# Patient Record
Sex: MALE | Race: WHITE | Employment: FULL TIME | URBAN - METROPOLITAN AREA
[De-identification: names, ages, dates, MRNs, and addresses within clinical notes are randomized per-mention and may not be internally consistent; named-entity substitution may affect disease eponyms.]

---

## 2023-03-08 PROBLEM — M75.41 SHOULDER IMPINGEMENT SYNDROME, RIGHT: Status: ACTIVE | Noted: 2023-03-08

## 2023-03-08 PROBLEM — K40.90 INGUINAL HERNIA, LEFT: Status: ACTIVE | Noted: 2023-03-08

## 2023-03-08 PROBLEM — M25.552 LEFT HIP PAIN: Status: ACTIVE | Noted: 2023-03-08

## 2023-03-08 PROBLEM — R10.9 ABDOMINAL PAIN: Status: ACTIVE | Noted: 2023-03-08

## 2023-03-08 PROBLEM — M25.552 PAIN OF BOTH HIP JOINTS: Status: ACTIVE | Noted: 2023-03-08

## 2023-03-08 PROBLEM — R03.0 ELEVATED BLOOD PRESSURE READING WITHOUT DIAGNOSIS OF HYPERTENSION: Status: ACTIVE | Noted: 2023-03-08

## 2023-03-08 PROBLEM — R05.9 COUGH: Status: ACTIVE | Noted: 2023-03-08

## 2023-03-08 PROBLEM — E66.01 MORBID OBESITY (MULTI): Status: ACTIVE | Noted: 2023-03-08

## 2023-03-08 PROBLEM — R79.89 LOW TESTOSTERONE: Status: ACTIVE | Noted: 2023-03-08

## 2023-03-08 PROBLEM — E66.01 MORBID OBESITY WITH BMI OF 40.0-44.9, ADULT (MULTI): Status: ACTIVE | Noted: 2023-03-08

## 2023-03-08 PROBLEM — L72.9 INFECTED CYST OF SKIN: Status: ACTIVE | Noted: 2023-03-08

## 2023-03-08 PROBLEM — S43.439A GLENOID LABRUM TEAR: Status: ACTIVE | Noted: 2023-03-08

## 2023-03-08 PROBLEM — M79.18 LEFT BUTTOCK PAIN: Status: ACTIVE | Noted: 2023-03-08

## 2023-03-08 PROBLEM — K40.20 NON-RECURRENT BILATERAL INGUINAL HERNIA WITHOUT OBSTRUCTION OR GANGRENE: Status: ACTIVE | Noted: 2023-03-08

## 2023-03-08 PROBLEM — L30.9 DERMATITIS: Status: ACTIVE | Noted: 2023-03-08

## 2023-03-08 PROBLEM — M75.20 BICEPS TENDONITIS: Status: ACTIVE | Noted: 2023-03-08

## 2023-03-08 PROBLEM — N52.9 MALE ERECTILE DISORDER: Status: ACTIVE | Noted: 2023-03-08

## 2023-03-08 PROBLEM — M25.561 RIGHT KNEE PAIN: Status: ACTIVE | Noted: 2023-03-08

## 2023-03-08 PROBLEM — M54.50 LOW BACK PAIN: Status: ACTIVE | Noted: 2023-03-08

## 2023-03-08 PROBLEM — R10.9 ABDOMINAL DISCOMFORT: Status: ACTIVE | Noted: 2023-03-08

## 2023-03-08 PROBLEM — M25.552 GREATER TROCHANTERIC PAIN SYNDROME OF LEFT LOWER EXTREMITY: Status: ACTIVE | Noted: 2023-03-08

## 2023-03-08 PROBLEM — T75.3XXA MOTION SICKNESS: Status: ACTIVE | Noted: 2023-03-08

## 2023-03-08 PROBLEM — M79.673 HEEL PAIN: Status: ACTIVE | Noted: 2023-03-08

## 2023-03-08 PROBLEM — R10.32 LEFT GROIN PAIN: Status: ACTIVE | Noted: 2023-03-08

## 2023-03-08 PROBLEM — N45.1 EPIDIDYMITIS: Status: ACTIVE | Noted: 2023-03-08

## 2023-03-08 PROBLEM — N63.0 BREAST MASS IN MALE: Status: ACTIVE | Noted: 2023-03-08

## 2023-03-08 PROBLEM — R63.2 POLYPHAGIA: Status: ACTIVE | Noted: 2023-03-08

## 2023-03-08 PROBLEM — I10 HTN (HYPERTENSION): Status: ACTIVE | Noted: 2023-03-08

## 2023-03-08 PROBLEM — L08.9 INFECTED CYST OF SKIN: Status: ACTIVE | Noted: 2023-03-08

## 2023-03-08 PROBLEM — M75.00 FROZEN SHOULDER: Status: ACTIVE | Noted: 2023-03-08

## 2023-03-08 PROBLEM — M75.81 TENDINITIS OF RIGHT ROTATOR CUFF: Status: ACTIVE | Noted: 2023-03-08

## 2023-03-08 PROBLEM — M25.551 PAIN OF BOTH HIP JOINTS: Status: ACTIVE | Noted: 2023-03-08

## 2023-03-08 PROBLEM — R68.82 LOW LIBIDO: Status: ACTIVE | Noted: 2023-03-08

## 2023-03-08 PROBLEM — M79.606 LEG PAIN: Status: ACTIVE | Noted: 2023-03-08

## 2023-03-08 PROBLEM — M51.36 LUMBAR DEGENERATIVE DISC DISEASE: Status: ACTIVE | Noted: 2023-03-08

## 2023-03-08 PROBLEM — N64.4 MASTALGIA: Status: ACTIVE | Noted: 2023-03-08

## 2023-03-08 PROBLEM — N50.819 PERSISTENT TESTICULAR PAIN: Status: ACTIVE | Noted: 2023-03-08

## 2023-03-08 PROBLEM — R10.2 MALE PELVIC PAIN: Status: ACTIVE | Noted: 2023-03-08

## 2023-03-08 PROBLEM — M51.369 LUMBAR DEGENERATIVE DISC DISEASE: Status: ACTIVE | Noted: 2023-03-08

## 2023-03-08 RX ORDER — SILDENAFIL 50 MG/1
50 TABLET, FILM COATED ORAL
COMMUNITY
Start: 2022-09-12

## 2023-03-08 RX ORDER — HYDROCHLOROTHIAZIDE 25 MG/1
1 TABLET ORAL DAILY
COMMUNITY
Start: 2014-10-02 | End: 2023-09-25

## 2023-03-08 RX ORDER — VALSARTAN 320 MG/1
1 TABLET ORAL DAILY
COMMUNITY
Start: 2015-01-16 | End: 2023-07-10

## 2023-03-13 ENCOUNTER — OFFICE VISIT (OUTPATIENT)
Dept: PRIMARY CARE | Facility: CLINIC | Age: 63
End: 2023-03-13
Payer: COMMERCIAL

## 2023-03-13 VITALS
BODY MASS INDEX: 41.72 KG/M2 | OXYGEN SATURATION: 95 % | WEIGHT: 308 LBS | HEART RATE: 99 BPM | TEMPERATURE: 98.5 F | SYSTOLIC BLOOD PRESSURE: 114 MMHG | DIASTOLIC BLOOD PRESSURE: 78 MMHG | HEIGHT: 72 IN

## 2023-03-13 DIAGNOSIS — R05.1 ACUTE COUGH: ICD-10-CM

## 2023-03-13 DIAGNOSIS — G93.31 POST VIRAL SYNDROME: Primary | ICD-10-CM

## 2023-03-13 DIAGNOSIS — I10 PRIMARY HYPERTENSION: ICD-10-CM

## 2023-03-13 PROBLEM — R10.9 ABDOMINAL PAIN: Status: RESOLVED | Noted: 2023-03-08 | Resolved: 2023-03-13

## 2023-03-13 PROBLEM — M79.673 HEEL PAIN: Status: RESOLVED | Noted: 2023-03-08 | Resolved: 2023-03-13

## 2023-03-13 PROBLEM — R63.2 POLYPHAGIA: Status: RESOLVED | Noted: 2023-03-08 | Resolved: 2023-03-13

## 2023-03-13 PROBLEM — M79.606 LEG PAIN: Status: RESOLVED | Noted: 2023-03-08 | Resolved: 2023-03-13

## 2023-03-13 PROBLEM — N50.819 PERSISTENT TESTICULAR PAIN: Status: RESOLVED | Noted: 2023-03-08 | Resolved: 2023-03-13

## 2023-03-13 PROBLEM — S43.439A GLENOID LABRUM TEAR: Status: RESOLVED | Noted: 2023-03-08 | Resolved: 2023-03-13

## 2023-03-13 PROBLEM — M25.561 RIGHT KNEE PAIN: Status: RESOLVED | Noted: 2023-03-08 | Resolved: 2023-03-13

## 2023-03-13 PROBLEM — R10.2 MALE PELVIC PAIN: Status: RESOLVED | Noted: 2023-03-08 | Resolved: 2023-03-13

## 2023-03-13 PROBLEM — E66.01 MORBID OBESITY (MULTI): Status: RESOLVED | Noted: 2023-03-08 | Resolved: 2023-03-13

## 2023-03-13 PROBLEM — N63.0 BREAST MASS IN MALE: Status: RESOLVED | Noted: 2023-03-08 | Resolved: 2023-03-13

## 2023-03-13 PROBLEM — M25.552 GREATER TROCHANTERIC PAIN SYNDROME OF LEFT LOWER EXTREMITY: Status: RESOLVED | Noted: 2023-03-08 | Resolved: 2023-03-13

## 2023-03-13 PROBLEM — K40.90 INGUINAL HERNIA, LEFT: Status: RESOLVED | Noted: 2023-03-08 | Resolved: 2023-03-13

## 2023-03-13 PROBLEM — R10.32 LEFT GROIN PAIN: Status: RESOLVED | Noted: 2023-03-08 | Resolved: 2023-03-13

## 2023-03-13 PROBLEM — M79.18 LEFT BUTTOCK PAIN: Status: RESOLVED | Noted: 2023-03-08 | Resolved: 2023-03-13

## 2023-03-13 PROBLEM — M51.36 LUMBAR DEGENERATIVE DISC DISEASE: Status: RESOLVED | Noted: 2023-03-08 | Resolved: 2023-03-13

## 2023-03-13 PROBLEM — R68.82 LOW LIBIDO: Status: RESOLVED | Noted: 2023-03-08 | Resolved: 2023-03-13

## 2023-03-13 PROBLEM — L08.9 INFECTED CYST OF SKIN: Status: RESOLVED | Noted: 2023-03-08 | Resolved: 2023-03-13

## 2023-03-13 PROBLEM — R79.89 LOW TESTOSTERONE: Status: RESOLVED | Noted: 2023-03-08 | Resolved: 2023-03-13

## 2023-03-13 PROBLEM — M25.552 LEFT HIP PAIN: Status: RESOLVED | Noted: 2023-03-08 | Resolved: 2023-03-13

## 2023-03-13 PROBLEM — M75.41 SHOULDER IMPINGEMENT SYNDROME, RIGHT: Status: RESOLVED | Noted: 2023-03-08 | Resolved: 2023-03-13

## 2023-03-13 PROBLEM — M75.81 TENDINITIS OF RIGHT ROTATOR CUFF: Status: RESOLVED | Noted: 2023-03-08 | Resolved: 2023-03-13

## 2023-03-13 PROBLEM — M25.552 PAIN OF BOTH HIP JOINTS: Status: RESOLVED | Noted: 2023-03-08 | Resolved: 2023-03-13

## 2023-03-13 PROBLEM — T75.3XXA MOTION SICKNESS: Status: RESOLVED | Noted: 2023-03-08 | Resolved: 2023-03-13

## 2023-03-13 PROBLEM — N45.1 EPIDIDYMITIS: Status: RESOLVED | Noted: 2023-03-08 | Resolved: 2023-03-13

## 2023-03-13 PROBLEM — N64.4 MASTALGIA: Status: RESOLVED | Noted: 2023-03-08 | Resolved: 2023-03-13

## 2023-03-13 PROBLEM — M75.20 BICEPS TENDONITIS: Status: RESOLVED | Noted: 2023-03-08 | Resolved: 2023-03-13

## 2023-03-13 PROBLEM — K40.20 NON-RECURRENT BILATERAL INGUINAL HERNIA WITHOUT OBSTRUCTION OR GANGRENE: Status: RESOLVED | Noted: 2023-03-08 | Resolved: 2023-03-13

## 2023-03-13 PROBLEM — R10.9 ABDOMINAL DISCOMFORT: Status: RESOLVED | Noted: 2023-03-08 | Resolved: 2023-03-13

## 2023-03-13 PROBLEM — L72.9 INFECTED CYST OF SKIN: Status: RESOLVED | Noted: 2023-03-08 | Resolved: 2023-03-13

## 2023-03-13 PROBLEM — R03.0 ELEVATED BLOOD PRESSURE READING WITHOUT DIAGNOSIS OF HYPERTENSION: Status: RESOLVED | Noted: 2023-03-08 | Resolved: 2023-03-13

## 2023-03-13 PROBLEM — M75.00 FROZEN SHOULDER: Status: RESOLVED | Noted: 2023-03-08 | Resolved: 2023-03-13

## 2023-03-13 PROBLEM — L30.9 DERMATITIS: Status: RESOLVED | Noted: 2023-03-08 | Resolved: 2023-03-13

## 2023-03-13 PROBLEM — M25.551 PAIN OF BOTH HIP JOINTS: Status: RESOLVED | Noted: 2023-03-08 | Resolved: 2023-03-13

## 2023-03-13 PROBLEM — M54.50 LOW BACK PAIN: Status: RESOLVED | Noted: 2023-03-08 | Resolved: 2023-03-13

## 2023-03-13 PROBLEM — M51.369 LUMBAR DEGENERATIVE DISC DISEASE: Status: RESOLVED | Noted: 2023-03-08 | Resolved: 2023-03-13

## 2023-03-13 PROCEDURE — 99213 OFFICE O/P EST LOW 20 MIN: CPT | Performed by: INTERNAL MEDICINE

## 2023-03-13 PROCEDURE — 3078F DIAST BP <80 MM HG: CPT | Performed by: INTERNAL MEDICINE

## 2023-03-13 PROCEDURE — 1036F TOBACCO NON-USER: CPT | Performed by: INTERNAL MEDICINE

## 2023-03-13 PROCEDURE — 3074F SYST BP LT 130 MM HG: CPT | Performed by: INTERNAL MEDICINE

## 2023-03-13 RX ORDER — METHYLPREDNISOLONE 4 MG/1
TABLET ORAL
Qty: 21 TABLET | Refills: 0 | Status: SHIPPED | OUTPATIENT
Start: 2023-03-13 | End: 2023-03-20

## 2023-03-13 RX ORDER — PANTOPRAZOLE SODIUM 40 MG/1
1 TABLET, DELAYED RELEASE ORAL DAILY
COMMUNITY
Start: 2023-01-29

## 2023-03-13 RX ORDER — LORATADINE 10 MG/1
10 TABLET ORAL DAILY
Qty: 30 TABLET | Refills: 2 | Status: SHIPPED | OUTPATIENT
Start: 2023-03-13 | End: 2023-03-16

## 2023-03-13 RX ORDER — BENZONATATE 100 MG/1
100 CAPSULE ORAL 3 TIMES DAILY PRN
Qty: 42 CAPSULE | Refills: 0 | Status: SHIPPED | OUTPATIENT
Start: 2023-03-13 | End: 2023-04-12

## 2023-03-13 ASSESSMENT — PATIENT HEALTH QUESTIONNAIRE - PHQ9
2. FEELING DOWN, DEPRESSED OR HOPELESS: NOT AT ALL
SUM OF ALL RESPONSES TO PHQ9 QUESTIONS 1 AND 2: 0
1. LITTLE INTEREST OR PLEASURE IN DOING THINGS: NOT AT ALL

## 2023-03-13 NOTE — ASSESSMENT & PLAN NOTE
Patient symptoms would be consistent with postviral cough syndrome.  We will Adeline start Tessalon Perles.  Medrol Dosepak prescribed.  Recommend Afrin twice daily for no more than 3 days.  Lungs sound clear.  Recommend Claritin once daily x10 days.  Follow-up if no improvement in 10 days

## 2023-03-13 NOTE — PROGRESS NOTES
Subjective   Patient ID: Blaine Méndez is a 62 y.o. male who presents for Cough (Patient  complaining of persistent cough).    HPI     Patient is a 62-year-old male with past medical history of hypertension and obesity who presents with chief complaint of ongoing cough x30 days.  He states he had an upper respiratory tract infection that has since resolved however the cough has been persistent.  He denies any maxillary sinus pressure or shortness of breath or fevers.  No productive sputum.    Review of Systems  Constitutional: No fever or chills  Cardiovascular: no chest pain, no palpitations and no syncope.   Respiratory: no cough, no shortness of breath during exertion and no shortness of breath at rest.   Gastrointestinal: no abdominal pain, no nausea and no vomiting.  Neuro: No Headache, no dizziness    Objective   /78   Pulse 99   Temp 36.9 °C (98.5 °F)   Ht 1.829 m (6')   Wt (!) 140 kg (308 lb)   SpO2 95%   BMI 41.77 kg/m²     Physical Exam  Constitutional: Alert and in no acute distress. Well developed, well nourished  Head and Face: Head and face: Normal.    Cardiovascular: Heart rate and rhythm were normal, normal S1 and S2. No peripheral edema.   Pulmonary: No respiratory distress. Clear bilateral breath sounds.  Musculoskeletal: Gait and station: Normal. Muscle strength/tone: Normal.   Skin: Normal skin color and pigmentation, normal skin turgor, and no rash.    Psychiatric: Judgment and insight: Intact. Mood and affect: Normal.            Assessment/Plan   Problem List Items Addressed This Visit          Respiratory    Cough       Circulatory    HTN (hypertension)       Other    Post viral syndrome - Primary     Patient symptoms would be consistent with postviral cough syndrome.  We will Adeline start Tessalon Perles.  Medrol Dosepak prescribed.  Recommend Afrin twice daily for no more than 3 days.  Lungs sound clear.  Recommend Claritin once daily x10 days.  Follow-up if no  improvement in 10 days         Relevant Medications    benzonatate (Tessalon) 100 mg capsule    methylPREDNISolone (Medrol Dospak) 4 mg tablets    loratadine (Claritin) 10 mg tablet

## 2023-03-15 DIAGNOSIS — G93.31 POST VIRAL SYNDROME: ICD-10-CM

## 2023-03-16 RX ORDER — LORATADINE 10 MG/1
TABLET ORAL
Qty: 90 TABLET | Refills: 1 | Status: SHIPPED | OUTPATIENT
Start: 2023-03-16

## 2023-07-09 DIAGNOSIS — I10 ESSENTIAL (PRIMARY) HYPERTENSION: ICD-10-CM

## 2023-07-10 RX ORDER — VALSARTAN 320 MG/1
320 TABLET ORAL DAILY
Qty: 90 TABLET | Refills: 2 | Status: SHIPPED | OUTPATIENT
Start: 2023-07-10 | End: 2024-05-06

## 2023-09-24 DIAGNOSIS — I10 ESSENTIAL (PRIMARY) HYPERTENSION: ICD-10-CM

## 2023-09-25 RX ORDER — HYDROCHLOROTHIAZIDE 25 MG/1
25 TABLET ORAL DAILY
Qty: 90 TABLET | Refills: 0 | Status: SHIPPED | OUTPATIENT
Start: 2023-09-25 | End: 2023-12-18

## 2023-10-13 ENCOUNTER — OFFICE VISIT (OUTPATIENT)
Dept: PRIMARY CARE | Facility: CLINIC | Age: 63
End: 2023-10-13
Payer: COMMERCIAL

## 2023-10-13 VITALS
HEIGHT: 72 IN | SYSTOLIC BLOOD PRESSURE: 124 MMHG | DIASTOLIC BLOOD PRESSURE: 77 MMHG | WEIGHT: 289 LBS | BODY MASS INDEX: 39.14 KG/M2 | HEART RATE: 58 BPM

## 2023-10-13 DIAGNOSIS — E66.01 MORBID OBESITY WITH BMI OF 40.0-44.9, ADULT (MULTI): ICD-10-CM

## 2023-10-13 DIAGNOSIS — Z00.00 HEALTH CARE MAINTENANCE: Primary | ICD-10-CM

## 2023-10-13 DIAGNOSIS — Z23 NEEDS FLU SHOT: ICD-10-CM

## 2023-10-13 DIAGNOSIS — N52.9 MALE ERECTILE DISORDER: ICD-10-CM

## 2023-10-13 DIAGNOSIS — I10 PRIMARY HYPERTENSION: ICD-10-CM

## 2023-10-13 LAB
ALBUMIN SERPL BCP-MCNC: 4.3 G/DL (ref 3.4–5)
ALP SERPL-CCNC: 93 U/L (ref 33–136)
ALT SERPL W P-5'-P-CCNC: 19 U/L (ref 10–52)
ANION GAP SERPL CALC-SCNC: 15 MMOL/L (ref 10–20)
AST SERPL W P-5'-P-CCNC: 18 U/L (ref 9–39)
BILIRUB SERPL-MCNC: 0.6 MG/DL (ref 0–1.2)
BUN SERPL-MCNC: 15 MG/DL (ref 6–23)
CALCIUM SERPL-MCNC: 9.5 MG/DL (ref 8.6–10.6)
CHLORIDE SERPL-SCNC: 103 MMOL/L (ref 98–107)
CHOLEST SERPL-MCNC: 187 MG/DL (ref 0–199)
CHOLESTEROL/HDL RATIO: 4.1
CO2 SERPL-SCNC: 24 MMOL/L (ref 21–32)
CREAT SERPL-MCNC: 0.85 MG/DL (ref 0.5–1.3)
ERYTHROCYTE [DISTWIDTH] IN BLOOD BY AUTOMATED COUNT: 14.3 % (ref 11.5–14.5)
GFR SERPL CREATININE-BSD FRML MDRD: >90 ML/MIN/1.73M*2
GLUCOSE SERPL-MCNC: 90 MG/DL (ref 74–99)
HCT VFR BLD AUTO: 49 % (ref 41–52)
HDLC SERPL-MCNC: 45.5 MG/DL
HGB BLD-MCNC: 15.4 G/DL (ref 13.5–17.5)
LDLC SERPL CALC-MCNC: 124 MG/DL
MCH RBC QN AUTO: 28.8 PG (ref 26–34)
MCHC RBC AUTO-ENTMCNC: 31.4 G/DL (ref 32–36)
MCV RBC AUTO: 92 FL (ref 80–100)
NON HDL CHOLESTEROL: 142 MG/DL (ref 0–149)
NRBC BLD-RTO: 0 /100 WBCS (ref 0–0)
PLATELET # BLD AUTO: 265 X10*3/UL (ref 150–450)
PMV BLD AUTO: 9.8 FL (ref 7.5–11.5)
POTASSIUM SERPL-SCNC: 4.1 MMOL/L (ref 3.5–5.3)
PROT SERPL-MCNC: 7.6 G/DL (ref 6.4–8.2)
RBC # BLD AUTO: 5.35 X10*6/UL (ref 4.5–5.9)
SODIUM SERPL-SCNC: 138 MMOL/L (ref 136–145)
T4 FREE SERPL-MCNC: 1.07 NG/DL (ref 0.78–1.48)
TRIGL SERPL-MCNC: 90 MG/DL (ref 0–149)
TSH SERPL-ACNC: 5.09 MIU/L (ref 0.44–3.98)
VLDL: 18 MG/DL (ref 0–40)
WBC # BLD AUTO: 7.1 X10*3/UL (ref 4.4–11.3)

## 2023-10-13 PROCEDURE — 3078F DIAST BP <80 MM HG: CPT | Performed by: INTERNAL MEDICINE

## 2023-10-13 PROCEDURE — 3008F BODY MASS INDEX DOCD: CPT | Performed by: INTERNAL MEDICINE

## 2023-10-13 PROCEDURE — 90471 IMMUNIZATION ADMIN: CPT | Performed by: INTERNAL MEDICINE

## 2023-10-13 PROCEDURE — 90686 IIV4 VACC NO PRSV 0.5 ML IM: CPT | Performed by: INTERNAL MEDICINE

## 2023-10-13 PROCEDURE — 85027 COMPLETE CBC AUTOMATED: CPT

## 2023-10-13 PROCEDURE — 3074F SYST BP LT 130 MM HG: CPT | Performed by: INTERNAL MEDICINE

## 2023-10-13 PROCEDURE — 1036F TOBACCO NON-USER: CPT | Performed by: INTERNAL MEDICINE

## 2023-10-13 PROCEDURE — 99396 PREV VISIT EST AGE 40-64: CPT | Performed by: INTERNAL MEDICINE

## 2023-10-13 PROCEDURE — 84443 ASSAY THYROID STIM HORMONE: CPT

## 2023-10-13 PROCEDURE — 80061 LIPID PANEL: CPT

## 2023-10-13 PROCEDURE — 84439 ASSAY OF FREE THYROXINE: CPT

## 2023-10-13 PROCEDURE — 36415 COLL VENOUS BLD VENIPUNCTURE: CPT

## 2023-10-13 PROCEDURE — 80053 COMPREHEN METABOLIC PANEL: CPT

## 2023-10-13 NOTE — PROGRESS NOTES
Subjective   Patient ID: Trey Méndez is a 63 y.o. male who presents for Annual Exam.          HPI     Patient is a 63-year-old male with past medical history of hypertension erectile dysfunction hemorrhoids obesity presents for wellness.  He states he tried coming off his blood pressure medications however his blood pressure increased to the 140s.  He is now back on his blood pressure medications and understands that he needs them.  No shortness of breath chest pains or palpitations.    Erectile dysfunction uses sildenafil as needed.  No need for refills at this time.    Patient does not get a significant amount of exercise.  His BMI is 39.  He is actually been losing weight which is phenomenal.  He is lost more than 15 pounds.  Keep up the good work.    Patient did need a flu shot.    Denies illicit substances or smoking.  Alcohol occasionally.    Review of Systems  Constitutional: No fever or chills, No Night Sweats  Eyes: No Blurry Vision or Eye sight problems  ENT: No Nasal Discharge, Hoarseness, sore throat  Cardiovascular: no chest pain, no palpitations and no syncope.   Respiratory: no cough, no shortness of breath during exertion and no shortness of breath at rest.   Gastrointestinal: no abdominal pain, no nausea and no vomiting.   : No issues with urinary stream, burning with urination, no blood in urine or stools  Skin: No Skin rashes or Lesions  Neuro: No Headache, no dizziness or Numbness or tingling  Psych: No Anxiety, depression or sleeping problems  Heme: No Easy bleeding or brusing.     Objective   /77   Pulse 58   Ht 1.829 m (6')   Wt 131 kg (289 lb)   BMI 39.20 kg/m²     Physical Exam  Constitutional: Alert and in no acute distress. Well developed, well nourished.   Head and Face: Head and face: Normal.    Eyes: Normal external exam. Pupils were equal in size, round, reactive to light (PERRL) with normal accommodation and extraocular movements intact (EOMI).   Ears, Nose,  Mouth, and Throat: External inspection of ears and nose: Normal.  Hearing: Normal.  Nasal mucosa, septum, and turbinates: Normal.  Lips, teeth, and gums: Normal.  Oropharynx: Normal.   Neck: No neck mass was observed. Supple. Thyroid not enlarged and there were no palpable thyroid nodules.   Cardiovascular: Heart rate and rhythm were normal, normal S1 and S2. Pedal pulses: Normal. No peripheral edema.   Pulmonary: No respiratory distress. Clear bilateral breath sounds.   Abdomen: Soft nontender; no abdominal mass palpated. Normal bowel sounds. No organomegaly.   : Deferred  Musculoskeletal: No joint swelling seen, normal movements of all extremities. Range of motion: Normal.  Muscle strength/tone: Normal.    Skin: Normal skin color and pigmentation, normal skin turgor, and no rash.   Neurologic: Deep tendon reflexes were 2+ and symmetric.   Psychiatric: Judgment and insight: Intact. Mood and affect: Normal.  Lymphatic: No cervical lymphadenopathy. Palpation of lymph nodes in axillae: Normal.  Palpation of lymph nodes in groin: Normal.    Lab Results   Component Value Date    WBC 8.0 09/12/2022    HGB 15.4 09/12/2022    HCT 47.0 09/12/2022     09/12/2022    CHOL 178 09/12/2022    TRIG 74 09/12/2022    HDL 42.1 09/12/2022    ALT 20 09/12/2022    AST 20 09/12/2022     09/12/2022    K 4.0 09/12/2022     09/12/2022    CREATININE 0.86 09/12/2022    BUN 15 09/12/2022    CO2 27 09/12/2022    TSH 4.81 (H) 09/12/2022    PSA 4.02 (H) 05/25/2021    INR 1.1 05/02/2019    HGBA1C 5.9 (A) 09/12/2022       Electrocardiogram 12 Lead  Normal sinus rhythm  Left axis deviation  Pulmonary disease pattern  Abnormal ECG  No previous ECGs available  Confirmed by CAREY CROWDER MD [id number]) on 9/3/2019 7:04:35 AM      Assessment/Plan   Problem List Items Addressed This Visit             ICD-10-CM    HTN (hypertension) I10     Continue blood pressure medications as it is         Male erectile disorder N52.9      Sildenafil as needed         Morbid obesity with BMI of 40.0-44.9, adult (CMS/[facility]) E66.01, Z68.41     Encourage continued weight reduction.  Keep up the excellent work          Other Visit Diagnoses         Codes    Health care maintenance    -  Primary Z00.00    Relevant Orders    CBC    Comprehensive metabolic panel    Lipid Panel    TSH with reflex to Free T4 if abnormal    Needs flu shot     Z23    Relevant Orders    Flu vaccine (IIV4) age 6 months and greater, preservative free              Dear Blaine Méndez     It was my pleasure to take care of you today in the office. Below are the things we discussed today:    1. Immunizations: Yearly Flu shot is recommended.          a: COVID: Up-to-date; recommend getting the booster         b: Tetanus: Up-to-date      2. Blood Work: Ordered  3. Seen your dentist twice a year  4. Yearly Eye exam is recommended    5. BMI: 39  6: Diet recommendations:   Eat Clean, Try to have as many home cooked meals as possible  Avoid processed foods which contain excess calories, sugar, and sodium.    7. Exercise recommendations:   150 minutes a week to maintain your weight     If you have to loose weight, you need a better diet and exercise plan.     8. Please get your Living will / Advance directive completed if you do not have one already. Please make sure our office has a copy of the latest one.     9. Colonoscopy: Up-to-date  10. PSA: Ordered    11.     Follow up in one year for a Physical. Please call the office before your Physical to see if you need blood work completed prior to your physical.     Please call me if any questions arise from now until your next visit. I will call you after I am done seeing patients. A Doctor is always available by phone when the office is closed. Please feel free to call for help with any problem that you feel shouldn't wait until the office re-opens.     Jt Cardenas, DO

## 2023-10-19 NOTE — RESULT ENCOUNTER NOTE
For the most part your labs look great.  Your blood counts kidney and liver are all within normal range.  Your thyroid is slightly off but the free T4 is normal.  No need to treat at this time however we will continue to monitor on an annual basis.  Your cholesterol is slightly elevated not enough that I would adjust medications or add additional medication but I would recommend a Mediterranean diet regular exercise and we can recheck 1 year from now.  Keep up the good work

## 2023-12-16 DIAGNOSIS — I10 ESSENTIAL (PRIMARY) HYPERTENSION: ICD-10-CM

## 2023-12-18 RX ORDER — HYDROCHLOROTHIAZIDE 25 MG/1
25 TABLET ORAL DAILY
Qty: 90 TABLET | Refills: 2 | Status: SHIPPED | OUTPATIENT
Start: 2023-12-18

## 2023-12-19 ENCOUNTER — PATIENT MESSAGE (OUTPATIENT)
Dept: PRIMARY CARE | Facility: CLINIC | Age: 63
End: 2023-12-19
Payer: COMMERCIAL

## 2023-12-19 DIAGNOSIS — Z00.00 HEALTH CARE MAINTENANCE: Primary | ICD-10-CM

## 2023-12-21 ENCOUNTER — LAB (OUTPATIENT)
Dept: LAB | Facility: LAB | Age: 63
End: 2023-12-21
Payer: COMMERCIAL

## 2023-12-21 DIAGNOSIS — Z00.00 HEALTH CARE MAINTENANCE: ICD-10-CM

## 2023-12-21 LAB — PSA SERPL-MCNC: 5.15 NG/ML

## 2023-12-21 PROCEDURE — 84153 ASSAY OF PSA TOTAL: CPT

## 2023-12-21 PROCEDURE — 36415 COLL VENOUS BLD VENIPUNCTURE: CPT

## 2024-02-20 ENCOUNTER — OFFICE VISIT (OUTPATIENT)
Dept: PRIMARY CARE | Facility: CLINIC | Age: 64
End: 2024-02-20
Payer: COMMERCIAL

## 2024-02-20 ENCOUNTER — LAB (OUTPATIENT)
Dept: LAB | Facility: LAB | Age: 64
End: 2024-02-20
Payer: COMMERCIAL

## 2024-02-20 VITALS — SYSTOLIC BLOOD PRESSURE: 121 MMHG | DIASTOLIC BLOOD PRESSURE: 82 MMHG

## 2024-02-20 DIAGNOSIS — R97.20 ELEVATED PSA: ICD-10-CM

## 2024-02-20 DIAGNOSIS — R97.20 ELEVATED PSA: Primary | ICD-10-CM

## 2024-02-20 DIAGNOSIS — I10 PRIMARY HYPERTENSION: ICD-10-CM

## 2024-02-20 LAB — PSA SERPL-MCNC: 5.9 NG/ML

## 2024-02-20 PROCEDURE — 84153 ASSAY OF PSA TOTAL: CPT

## 2024-02-20 PROCEDURE — 3074F SYST BP LT 130 MM HG: CPT | Performed by: INTERNAL MEDICINE

## 2024-02-20 PROCEDURE — 3008F BODY MASS INDEX DOCD: CPT | Performed by: INTERNAL MEDICINE

## 2024-02-20 PROCEDURE — 1036F TOBACCO NON-USER: CPT | Performed by: INTERNAL MEDICINE

## 2024-02-20 PROCEDURE — 36415 COLL VENOUS BLD VENIPUNCTURE: CPT

## 2024-02-20 PROCEDURE — 3079F DIAST BP 80-89 MM HG: CPT | Performed by: INTERNAL MEDICINE

## 2024-02-20 PROCEDURE — 99213 OFFICE O/P EST LOW 20 MIN: CPT | Performed by: INTERNAL MEDICINE

## 2024-02-20 NOTE — PROGRESS NOTES
Subjective   Patient ID: Trey Méndez is a 63 y.o. male who presents for No chief complaint on file..          HPI     Patient is a 63-year-old male with past medical history of hypertension erectile dysfunction hemorrhoids obesity presents for follow-up to PSA.    Last visit his PSA had increased by almost 2 units.  He is here for PSA recheck.  No urinary dysfunction.  No dribbling of the urine.  No blood in the urine    Hypertension well-controlled on current medications.  No headaches or changes in vision.  Tolerating medication well    Erectile dysfunction uses sildenafil as needed.  No need for refills at this time.    Review of Systems  Constitutional: No fever or chills, No Night Sweats  Eyes: No Blurry Vision or Eye sight problems  ENT: No Nasal Discharge, Hoarseness, sore throat  Cardiovascular: no chest pain, no palpitations and no syncope.   Respiratory: no cough, no shortness of breath during exertion and no shortness of breath at rest.   Gastrointestinal: no abdominal pain, no nausea and no vomiting.   : No issues with urinary stream, burning with urination, no blood in urine or stools  Skin: No Skin rashes or Lesions  Neuro: No Headache, no dizziness or Numbness or tingling  Psych: No Anxiety, depression or sleeping problems  Heme: No Easy bleeding or brusing.     Objective   /82     Physical Exam  Constitutional: Alert and in no acute distress. Well developed, well nourished.   Head and Face: Head and face: Normal.    Eyes: Normal external exam. Pupils were equal in size, round, reactive to light (PERRL) with normal accommodation and extraocular movements intact (EOMI).   Ears, Nose, Mouth, and Throat: External inspection of ears and nose: Normal.  Hearing: Normal.  Nasal mucosa, septum, and turbinates: Normal.  Lips, teeth, and gums: Normal.  Oropharynx: Normal.   Neck: No neck mass was observed. Supple. Thyroid not enlarged and there were no palpable thyroid nodules.   Cardiovascular:  Heart rate and rhythm were normal, normal S1 and S2. Pedal pulses: Normal. No peripheral edema.   Pulmonary: No respiratory distress. Clear bilateral breath sounds.   Abdomen: Soft nontender; no abdominal mass palpated. Normal bowel sounds. No organomegaly.   : Deferred  Musculoskeletal: No joint swelling seen, normal movements of all extremities. Range of motion: Normal.  Muscle strength/tone: Normal.    Skin: Normal skin color and pigmentation, normal skin turgor, and no rash.   Neurologic: Deep tendon reflexes were 2+ and symmetric.   Psychiatric: Judgment and insight: Intact. Mood and affect: Normal.  Lymphatic: No cervical lymphadenopathy. Palpation of lymph nodes in axillae: Normal.  Palpation of lymph nodes in groin: Normal.    Lab Results   Component Value Date    WBC 7.1 10/13/2023    HGB 15.4 10/13/2023    HCT 49.0 10/13/2023     10/13/2023    CHOL 187 10/13/2023    TRIG 90 10/13/2023    HDL 45.5 10/13/2023    ALT 19 10/13/2023    AST 18 10/13/2023     10/13/2023    K 4.1 10/13/2023     10/13/2023    CREATININE 0.85 10/13/2023    BUN 15 10/13/2023    CO2 24 10/13/2023    TSH 5.09 (H) 10/13/2023    PSA 4.02 (H) 05/25/2021    INR 1.1 05/02/2019    HGBA1C 5.9 (A) 09/12/2022       Electrocardiogram 12 Lead  Normal sinus rhythm  Left axis deviation  Pulmonary disease pattern  Abnormal ECG  No previous ECGs available  Confirmed by CAREY CROWDER MD [id number]) on 9/3/2019 7:04:35 AM      Assessment/Plan   Problem List Items Addressed This Visit             ICD-10-CM    HTN (hypertension) I10     Controlled on current medications.         Elevated PSA - Primary R97.20     Check PSA.  If it continues to rise v may need urologic evaluation.         Relevant Orders    PSA

## 2024-02-21 ENCOUNTER — PATIENT MESSAGE (OUTPATIENT)
Dept: PRIMARY CARE | Facility: CLINIC | Age: 64
End: 2024-02-21
Payer: COMMERCIAL

## 2024-02-21 DIAGNOSIS — R97.20 ELEVATED PSA: Primary | ICD-10-CM

## 2024-02-23 DIAGNOSIS — M79.673 PAIN OF FOOT, UNSPECIFIED LATERALITY: Primary | ICD-10-CM

## 2024-03-04 ENCOUNTER — OFFICE VISIT (OUTPATIENT)
Dept: UROLOGY | Facility: HOSPITAL | Age: 64
End: 2024-03-04
Payer: COMMERCIAL

## 2024-03-04 DIAGNOSIS — M25.559 HIP PAIN, UNSPECIFIED LATERALITY: ICD-10-CM

## 2024-03-04 DIAGNOSIS — R97.20 ELEVATED PSA: Primary | ICD-10-CM

## 2024-03-04 PROCEDURE — 99203 OFFICE O/P NEW LOW 30 MIN: CPT | Performed by: UROLOGY

## 2024-03-04 PROCEDURE — 1036F TOBACCO NON-USER: CPT | Performed by: UROLOGY

## 2024-03-04 PROCEDURE — 99213 OFFICE O/P EST LOW 20 MIN: CPT | Performed by: UROLOGY

## 2024-03-04 PROCEDURE — 3008F BODY MASS INDEX DOCD: CPT | Performed by: UROLOGY

## 2024-03-04 NOTE — PROGRESS NOTES
HPI:   Blaine Méndez is a 63 y.o. who presents with an elevated PSA. Intermittent scrotal pain on left side.      Urinary Difficulties: None  Unexpected weight loss: None  Family history of prostate cancer:  Uncle recently dx with prostate ca  Previous biopsy: None  Smoker? No  -intermittent pain is only on the left  -hx of hernia surgery    Lab Results   Component Value Date    TESTOSTERONE 383 05/25/2021     Lab Results   Component Value Date    PSA 5.90 (H) 02/20/2024    PSA 4.02 (H) 05/25/2021       PMH:  Past Medical History:   Diagnosis Date    Left lower quadrant pain 07/13/2019    Left groin pain    Personal history of other diseases of the circulatory system 01/03/2020    History of hypertension    Personal history of other diseases of the digestive system 04/08/2019    History of diverticulitis of colon    Personal history of other diseases of the musculoskeletal system and connective tissue 04/08/2019    History of backache    Personal history of other diseases of the respiratory system 03/21/2018    History of acute bronchitis    Personal history of other endocrine, nutritional and metabolic disease 01/03/2020    History of morbid obesity    Personal history of other specified conditions     History of abdominal pain        PSH:  Past Surgical History:   Procedure Laterality Date    OTHER SURGICAL HISTORY  01/03/2020    Colonoscopy        Medications:    Current Outpatient Medications:     hydroCHLOROthiazide (HYDRODiuril) 25 mg tablet, Take 1 tablet (25 mg) by mouth once daily., Disp: 90 tablet, Rfl: 2    loratadine (Claritin) 10 mg tablet, TAKE 1 TABLET BY MOUTH EVERY DAY, Disp: 90 tablet, Rfl: 1    pantoprazole (ProtoNix) 40 mg EC tablet, Take 1 tablet (40 mg) by mouth once daily., Disp: , Rfl:     psyllium (Metamucil) 3.4 gram packet, Take 1 Dose by mouth in the morning and 1 Dose before bedtime., Disp: , Rfl:     sildenafil (Viagra) 50 mg tablet, Take 1 tablet (50 mg) by mouth. TAKE 1 TABLET DAILY  1 HOUR BEFORE NEEDED, Disp: , Rfl:     valsartan (Diovan) 320 mg tablet, Take 1 tablet (320 mg) by mouth once daily., Disp: 90 tablet, Rfl: 2    Allergy:  No Known Allergies     Exam  NAD  Nonlabored Breathing  Abd nondistended  Testicles descended bilaterally, nontender, no masses  Vasa palpable bilaterally  Penis circ'd, no lesions, no plaques  No hernias  Difficult exam    Assessment/Plan  Elevated PSA: Discussed the different etiologies of elevated PSA, as well as the risks and benefits of screening. We discussed prostate cancer and both MRI and prostate biopsy. I counseled the patient regarding risk of infection, bleeding, etc from prostate biopsy.     prostate MRI  repeat psa  UCx    #Scrotal pain / ? Hip/leg pain: appears to be posterior to scrotum but will check US to ensure no abnormality  -scrotal US  -Ucx  -follow up with PCP    F/U after MRI    Scribe Attestation  By signing my name below, I, Natividad Dee, Scribe, attest that this documentation  has been prepared under the direction and in the presence of Shakira Boogie MD.

## 2024-03-06 ENCOUNTER — LAB (OUTPATIENT)
Dept: LAB | Facility: LAB | Age: 64
End: 2024-03-06
Payer: COMMERCIAL

## 2024-03-06 DIAGNOSIS — R97.20 ELEVATED PSA: ICD-10-CM

## 2024-03-06 LAB — PSA SERPL-MCNC: 5.24 NG/ML

## 2024-03-06 PROCEDURE — 84153 ASSAY OF PSA TOTAL: CPT

## 2024-03-06 PROCEDURE — 36415 COLL VENOUS BLD VENIPUNCTURE: CPT

## 2024-03-06 PROCEDURE — 87086 URINE CULTURE/COLONY COUNT: CPT

## 2024-03-07 LAB — BACTERIA UR CULT: NO GROWTH

## 2024-03-15 ENCOUNTER — APPOINTMENT (OUTPATIENT)
Dept: PRIMARY CARE | Facility: CLINIC | Age: 64
End: 2024-03-15
Payer: COMMERCIAL

## 2024-03-20 ENCOUNTER — HOSPITAL ENCOUNTER (OUTPATIENT)
Dept: RADIOLOGY | Facility: HOSPITAL | Age: 64
Discharge: HOME | End: 2024-03-20
Payer: COMMERCIAL

## 2024-03-20 ENCOUNTER — APPOINTMENT (OUTPATIENT)
Dept: RADIOLOGY | Facility: HOSPITAL | Age: 64
End: 2024-03-20
Payer: COMMERCIAL

## 2024-03-20 DIAGNOSIS — R97.20 ELEVATED PSA: ICD-10-CM

## 2024-03-20 DIAGNOSIS — M25.559 HIP PAIN, UNSPECIFIED LATERALITY: ICD-10-CM

## 2024-03-20 PROCEDURE — 72197 MRI PELVIS W/O & W/DYE: CPT | Performed by: RADIOLOGY

## 2024-03-20 PROCEDURE — A9575 INJ GADOTERATE MEGLUMI 0.1ML: HCPCS

## 2024-03-20 PROCEDURE — 93975 VASCULAR STUDY: CPT

## 2024-03-20 PROCEDURE — 93976 VASCULAR STUDY: CPT | Performed by: RADIOLOGY

## 2024-03-20 PROCEDURE — 72197 MRI PELVIS W/O & W/DYE: CPT

## 2024-03-20 PROCEDURE — 2550000001 HC RX 255 CONTRASTS

## 2024-03-20 PROCEDURE — 76870 US EXAM SCROTUM: CPT | Performed by: RADIOLOGY

## 2024-03-20 RX ORDER — GADOTERATE MEGLUMINE 376.9 MG/ML
27 INJECTION INTRAVENOUS
Status: COMPLETED | OUTPATIENT
Start: 2024-03-20 | End: 2024-03-20

## 2024-03-20 RX ADMIN — GADOTERATE MEGLUMINE 27 ML: 376.9 INJECTION INTRAVENOUS at 16:42

## 2024-03-25 ENCOUNTER — OFFICE VISIT (OUTPATIENT)
Dept: UROLOGY | Facility: HOSPITAL | Age: 64
End: 2024-03-25
Payer: COMMERCIAL

## 2024-03-25 DIAGNOSIS — Z12.5 PROSTATE CANCER SCREENING: ICD-10-CM

## 2024-03-25 DIAGNOSIS — N40.0 BENIGN PROSTATIC HYPERPLASIA, UNSPECIFIED WHETHER LOWER URINARY TRACT SYMPTOMS PRESENT: Primary | ICD-10-CM

## 2024-03-25 DIAGNOSIS — R97.20 ELEVATED PSA: ICD-10-CM

## 2024-03-25 DIAGNOSIS — M25.559 HIP PAIN, UNSPECIFIED LATERALITY: ICD-10-CM

## 2024-03-25 DIAGNOSIS — Z09 ENCOUNTER FOR FOLLOW-UP: ICD-10-CM

## 2024-03-25 DIAGNOSIS — Z71.2 ENCOUNTER TO DISCUSS TEST RESULTS: ICD-10-CM

## 2024-03-25 PROCEDURE — 1036F TOBACCO NON-USER: CPT | Performed by: UROLOGY

## 2024-03-25 PROCEDURE — 3008F BODY MASS INDEX DOCD: CPT | Performed by: UROLOGY

## 2024-03-25 PROCEDURE — 99213 OFFICE O/P EST LOW 20 MIN: CPT | Performed by: UROLOGY

## 2024-03-25 NOTE — PROGRESS NOTES
HPI:   Blaine Méndez is a 63 y.o. who presents with an elevated PSA. Intermittent scrotal pain on left side.    Last Visit 3/4/24:  -prostate MRI  -repeat psa  -UCx  -scrotal US  -Ucx    Todays Visit:  #Elevated PSA  -reviewed MRI today     Urinary Difficulties: None  Unexpected weight loss: None  Family history of prostate cancer:  Uncle recently dx with prostate ca  Previous biopsy: None  Smoker? No  -intermittent pain is only on the left  -hx of hernia surgery    MR Prostate with larry boundaries 3/20/24: Personally reviewed and interpreted.    IMPRESSION:  BPH changes of the transition zone. Diffuse non nodular  hypointensities within the peripheral zone, without evidence of  focally restricted diffusion ( PI-RADS 2).    #Scrotal Pain/ hip-leg pain  -US benign, will continue to monitor for now     US scrotum w dopplers: Personally reviewed and interpreted.    IMPRESSION:  Unremarkable scrotal/testicular ultrasound    Lab Results   Component Value Date    PSA 5.24 (H) 03/06/2024    PSA 5.90 (H) 02/20/2024    PSA 4.02 (H) 05/25/2021       PMH:  Past Medical History:   Diagnosis Date    Left lower quadrant pain 07/13/2019    Left groin pain    Personal history of other diseases of the circulatory system 01/03/2020    History of hypertension    Personal history of other diseases of the digestive system 04/08/2019    History of diverticulitis of colon    Personal history of other diseases of the musculoskeletal system and connective tissue 04/08/2019    History of backache    Personal history of other diseases of the respiratory system 03/21/2018    History of acute bronchitis    Personal history of other endocrine, nutritional and metabolic disease 01/03/2020    History of morbid obesity    Personal history of other specified conditions     History of abdominal pain        PSH:  Past Surgical History:   Procedure Laterality Date    OTHER SURGICAL HISTORY  01/03/2020    Colonoscopy        Medications:    Current  Outpatient Medications:     hydroCHLOROthiazide (HYDRODiuril) 25 mg tablet, Take 1 tablet (25 mg) by mouth once daily., Disp: 90 tablet, Rfl: 2    loratadine (Claritin) 10 mg tablet, TAKE 1 TABLET BY MOUTH EVERY DAY, Disp: 90 tablet, Rfl: 1    pantoprazole (ProtoNix) 40 mg EC tablet, Take 1 tablet (40 mg) by mouth once daily., Disp: , Rfl:     psyllium (Metamucil) 3.4 gram packet, Take 1 Dose by mouth in the morning and 1 Dose before bedtime., Disp: , Rfl:     sildenafil (Viagra) 50 mg tablet, Take 1 tablet (50 mg) by mouth. TAKE 1 TABLET DAILY 1 HOUR BEFORE NEEDED, Disp: , Rfl:     valsartan (Diovan) 320 mg tablet, Take 1 tablet (320 mg) by mouth once daily., Disp: 90 tablet, Rfl: 2    Allergy:  No Known Allergies     Exam  Testicles descended bilaterally, nontender, no masses  Vasa palpable bilaterally  Penis circ'd, no lesions, no plaques    Assessment/Plan  Elevated PSA: Discussed the different etiologies of elevated PSA, as well as the risks and benefits of screening. We discussed prostate cancer and both MRI and prostate biopsy. I counseled the patient regarding risk of infection, bleeding, etc from prostate biopsy.     Reviewed MRI results today, Pi-RADS 2   PSA in 6 months    #Scrotal pain / ? Hip/leg pain: appears to be posterior to scrotum but will check US to ensure no abnormality  -US benign  -continue to monitor  -follow up with PCP    F/U in 6 months with CNP with PSA    Scribe Attestation  By signing my name below, I, Julio Levy, attest that this documentation  has been prepared under the direction and in the presence of Shakira Boogie MD.

## 2024-04-05 ENCOUNTER — OFFICE VISIT (OUTPATIENT)
Dept: PODIATRY | Facility: CLINIC | Age: 64
End: 2024-04-05
Payer: COMMERCIAL

## 2024-04-05 DIAGNOSIS — M79.672 LEFT FOOT PAIN: ICD-10-CM

## 2024-04-05 DIAGNOSIS — M79.673 PAIN OF FOOT, UNSPECIFIED LATERALITY: ICD-10-CM

## 2024-04-05 DIAGNOSIS — M72.2 PLANTAR FASCIITIS: Primary | ICD-10-CM

## 2024-04-05 PROCEDURE — 3008F BODY MASS INDEX DOCD: CPT | Performed by: PODIATRIST

## 2024-04-05 PROCEDURE — 99202 OFFICE O/P NEW SF 15 MIN: CPT | Performed by: PODIATRIST

## 2024-04-05 NOTE — PROGRESS NOTES
CC: left heel pain.     HPI:  Patient presents complaining left  heel pain  x 3 months Pain is mild and rated as 3-4/10. Pain is present with initial step and after sitting, denies trauma.   Denies any swelling or redness.     PCP: Dr. Cardenas  Last visit: 2-20-24     PMH  Past Medical History:   Diagnosis Date    Left lower quadrant pain 07/13/2019    Left groin pain    Personal history of other diseases of the circulatory system 01/03/2020    History of hypertension    Personal history of other diseases of the digestive system 04/08/2019    History of diverticulitis of colon    Personal history of other diseases of the musculoskeletal system and connective tissue 04/08/2019    History of backache    Personal history of other diseases of the respiratory system 03/21/2018    History of acute bronchitis    Personal history of other endocrine, nutritional and metabolic disease 01/03/2020    History of morbid obesity    Personal history of other specified conditions     History of abdominal pain     MEDS    Current Outpatient Medications:     hydroCHLOROthiazide (HYDRODiuril) 25 mg tablet, Take 1 tablet (25 mg) by mouth once daily., Disp: 90 tablet, Rfl: 2    loratadine (Claritin) 10 mg tablet, TAKE 1 TABLET BY MOUTH EVERY DAY, Disp: 90 tablet, Rfl: 1    pantoprazole (ProtoNix) 40 mg EC tablet, Take 1 tablet (40 mg) by mouth once daily., Disp: , Rfl:     psyllium (Metamucil) 3.4 gram packet, Take 1 Dose by mouth in the morning and 1 Dose before bedtime., Disp: , Rfl:     sildenafil (Viagra) 50 mg tablet, Take 1 tablet (50 mg) by mouth. TAKE 1 TABLET DAILY 1 HOUR BEFORE NEEDED, Disp: , Rfl:     valsartan (Diovan) 320 mg tablet, Take 1 tablet (320 mg) by mouth once daily., Disp: 90 tablet, Rfl: 2  Allergies  No Known Allergies  Social History     Socioeconomic History    Marital status:      Spouse name: Not on file    Number of children: Not on file    Years of education: Not on file    Highest education level: Not  on file   Occupational History    Not on file   Tobacco Use    Smoking status: Never     Passive exposure: Never    Smokeless tobacco: Never   Substance and Sexual Activity    Alcohol use: Defer    Drug use: Never    Sexual activity: Not on file   Other Topics Concern    Not on file   Social History Narrative    Not on file     Social Determinants of Health     Financial Resource Strain: Not on file   Food Insecurity: Not on file   Transportation Needs: Not on file   Physical Activity: Not on file   Stress: Not on file   Social Connections: Not on file   Intimate Partner Violence: Not on file   Housing Stability: Not on file     Family History   Problem Relation Name Age of Onset    Other (cardiac disorder) Father      Hyperlipidemia Father      Hypertension Father      Lung cancer Paternal Grandmother      Lung cancer Paternal Grandfather       Past Surgical History:   Procedure Laterality Date    OTHER SURGICAL HISTORY  01/03/2020    Colonoscopy       REVIEW OF SYSTEMS    Musculoskeletal: + as noted in HPI.       Physical examination:   On General Observation: Patient is a pleasant, cooperative, well developed 63 y.o.  adult male. The patient is alert and oriented to time, place and person. Patient has normal affect and mood.  There were no vitals taken for this visit.    Vascular:  DP and PT pulses are 2/4 b/l.  Mild edema to noted to left heel.      Muscular: Strength is 5/5 for all instrinsic and extrinsic muscle groups with exception of ankle dorsiflexion and plantarflexion d/t guarding.  Pain with palpation to the plantar medial aspect of the left heel at the insertion of the plantar medial calcaneal tubercle.  No signs of calcaneal stress fracture.      Neuro:   Light touch present bilateral.     Derm:   Skin texture and turgor within normal limits.     No rashes, subcutaneous nodules, or open lesions noted.  No hyperkeratotic tissue. No erythema    Ortho:  Pain is present to the medial band of the plantar  fascia, supination of the foot is present, ROM is stable 1st mpj, stj, aj b/l le    ASSESSMENT:    Plantar fasciitis left  Pain left foot       PLAN:   Consukt  A comprehensive history and physical examination were preformed. The patient was educated on clinical findings, diagnosis and treatment plans. Patient understands all that has been explained and all questions were answered to apparent satisfaction.     - We discussed the etiology of the heel complaints as well as the plantar fasciitis treatment protocol.  -Will obtain xrays of the foot  - Pt to to begin stretching, icing, appropriate shoes.   -Advised patient on use of anti-inflammatory medications .   - Patient given instructions to start PT. Handout given   - Consider custom inserts at next visit pending insurance coverage.     - Follow up in 2 weeks      Rodrick Valera DPM

## 2024-04-09 ENCOUNTER — HOSPITAL ENCOUNTER (OUTPATIENT)
Dept: RADIOLOGY | Facility: CLINIC | Age: 64
Discharge: HOME | End: 2024-04-09
Payer: COMMERCIAL

## 2024-04-09 DIAGNOSIS — M72.2 PLANTAR FASCIITIS: ICD-10-CM

## 2024-04-09 PROCEDURE — 73630 X-RAY EXAM OF FOOT: CPT | Mod: LEFT SIDE | Performed by: RADIOLOGY

## 2024-04-09 PROCEDURE — 73630 X-RAY EXAM OF FOOT: CPT | Mod: LT

## 2024-04-10 ENCOUNTER — TELEPHONE (OUTPATIENT)
Dept: PRIMARY CARE | Facility: CLINIC | Age: 64
End: 2024-04-10
Payer: COMMERCIAL

## 2024-04-10 NOTE — TELEPHONE ENCOUNTER
[facility] CHOICE PLUS [facility]SA  [phone]  PER JESS LAURA  CPT  X2  DX M72.2 ARE VALID AND BILLABLE, BASED ON MEDICAL NECESSITY  PLAN PAYS AT 80% OF THE ALLOWED AMT AFTER DEDUCTIBLE OF $2000 HAS BEEN MET. SO FAR ALL HAS BEEN SATISFIED  PLAN ALLOWS 1 PAIR EVERY 3 YEARS  NO PRIOR AUTH IS NEEDED  REFERENCE #[id number]    SPOKE W PATIENT AND GAVE HIM THE ABOVE INFO. HE ALREADY HAS AN APPT SCHEDULED IN 1 1/2 WEEKS. HE WILL BE MOLDED AT THAT TIME. THANK YOU!

## 2024-04-10 NOTE — TELEPHONE ENCOUNTER
----- Message from Rodrick Valera DPM sent at 4/5/2024  2:19 PM EDT -----  Regarding: orthotics  Please check orthotic benefits dx m72.2 thanks

## 2024-04-19 ENCOUNTER — OFFICE VISIT (OUTPATIENT)
Dept: PODIATRY | Facility: CLINIC | Age: 64
End: 2024-04-19
Payer: COMMERCIAL

## 2024-04-19 DIAGNOSIS — M72.2 PLANTAR FASCIITIS: Primary | ICD-10-CM

## 2024-04-19 DIAGNOSIS — M79.672 LEFT FOOT PAIN: ICD-10-CM

## 2024-04-19 PROCEDURE — L3020 FOOT LONGITUD/METATARSAL SUP: HCPCS | Performed by: PODIATRIST

## 2024-04-19 PROCEDURE — 3008F BODY MASS INDEX DOCD: CPT | Performed by: PODIATRIST

## 2024-04-19 PROCEDURE — 99212 OFFICE O/P EST SF 10 MIN: CPT | Performed by: PODIATRIST

## 2024-04-19 NOTE — PROGRESS NOTES
CC: left heel pain follow up     HPI:  Patient presents follow up left  heel pain  x 3 months Pain is mild and rated as 3-4/10. Pain is present with initial step and after sitting, denies trauma.   Denies any swelling or redness. Doing the hep and price, had xrays, here for orthotics, slight improvement.     PCP: Dr. Cardenas  Last visit: 2-20-24      PMH  Medical History        Past Medical History:   Diagnosis Date    Left lower quadrant pain 07/13/2019     Left groin pain    Personal history of other diseases of the circulatory system 01/03/2020     History of hypertension    Personal history of other diseases of the digestive system 04/08/2019     History of diverticulitis of colon    Personal history of other diseases of the musculoskeletal system and connective tissue 04/08/2019     History of backache    Personal history of other diseases of the respiratory system 03/21/2018     History of acute bronchitis    Personal history of other endocrine, nutritional and metabolic disease 01/03/2020     History of morbid obesity    Personal history of other specified conditions       History of abdominal pain         MEDS     Current Outpatient Medications:     hydroCHLOROthiazide (HYDRODiuril) 25 mg tablet, Take 1 tablet (25 mg) by mouth once daily., Disp: 90 tablet, Rfl: 2    loratadine (Claritin) 10 mg tablet, TAKE 1 TABLET BY MOUTH EVERY DAY, Disp: 90 tablet, Rfl: 1    pantoprazole (ProtoNix) 40 mg EC tablet, Take 1 tablet (40 mg) by mouth once daily., Disp: , Rfl:     psyllium (Metamucil) 3.4 gram packet, Take 1 Dose by mouth in the morning and 1 Dose before bedtime., Disp: , Rfl:     sildenafil (Viagra) 50 mg tablet, Take 1 tablet (50 mg) by mouth. TAKE 1 TABLET DAILY 1 HOUR BEFORE NEEDED, Disp: , Rfl:     valsartan (Diovan) 320 mg tablet, Take 1 tablet (320 mg) by mouth once daily., Disp: 90 tablet, Rfl: 2  Allergies  No Known Allergies  Social History   Social History            Socioeconomic History    Marital  status:        Spouse name: Not on file    Number of children: Not on file    Years of education: Not on file    Highest education level: Not on file   Occupational History    Not on file   Tobacco Use    Smoking status: Never       Passive exposure: Never    Smokeless tobacco: Never   Substance and Sexual Activity    Alcohol use: Defer    Drug use: Never    Sexual activity: Not on file   Other Topics Concern    Not on file   Social History Narrative    Not on file      Social Determinants of Health      Financial Resource Strain: Not on file   Food Insecurity: Not on file   Transportation Needs: Not on file   Physical Activity: Not on file   Stress: Not on file   Social Connections: Not on file   Intimate Partner Violence: Not on file   Housing Stability: Not on file         Family History          Family History   Problem Relation Name Age of Onset    Other (cardiac disorder) Father        Hyperlipidemia Father        Hypertension Father        Lung cancer Paternal Grandmother        Lung cancer Paternal Grandfather             Surgical History         Past Surgical History:   Procedure Laterality Date    OTHER SURGICAL HISTORY   01/03/2020     Colonoscopy            REVIEW OF SYSTEMS     Musculoskeletal: + as noted in HPI.         Physical examination:   On General Observation: Patient is a pleasant, cooperative, well developed 63 y.o.  adult male. The patient is alert and oriented to time, place and person. Patient has normal affect and mood.  There were no vitals taken for this visit.     Vascular:  DP and PT pulses are 2/4 b/l.  Mild edema to noted to left heel.       Muscular: Strength is 5/5 for all instrinsic and extrinsic muscle groups with exception of ankle dorsiflexion and plantarflexion d/t guarding.  Pain with palpation to the plantar medial aspect of the left heel at the insertion of the plantar medial calcaneal tubercle.  No signs of calcaneal stress fracture.       Neuro:   Light touch  present bilateral.      Derm:   Skin texture and turgor within normal limits.     No rashes, subcutaneous nodules, or open lesions noted.  No hyperkeratotic tissue. No erythema     Ortho:  Pain is present to the medial band of the plantar fascia, supination of the foot is present, ROM is stable 1st mpj, stj, aj b/l le    XRAYS:  Narrative & Impression   Interpreted By:  Eleuterio Li,   STUDY:  XR FOOT LEFT 3+ VIEWS  4/9/2024 8:18 am      INDICATION:  Signs/Symptoms:heel pain      COMPARISON:  None.      ACCESSION NUMBER(S):  [id number]      ORDERING CLINICIAN:  EMILIA CONDE      TECHNIQUE:  Three views of the left foot including AP , oblique and lateral  projections were obtained.      FINDINGS:  There is no radiographic evidence of acute fracture or dislocation  identified. The joint spaces are well preserved throughout without  significant degenerative changes.      IMPRESSION:  1. No acute fracture or dislocation identified.         ASSESSMENT:    Plantar fasciitis left  Pain left foot        PLAN:     Exam  Reviewed xrays with pt, acute pathology  Reviewed treatment plan, slight pain still present medial plantar fascia, reviewed options, will continue the hep and price, casted for orthotics custom biofoam sport, will hold off on injection or PT at this time.     Emilia Conde DPM   full weight-bearing

## 2024-05-05 DIAGNOSIS — I10 ESSENTIAL (PRIMARY) HYPERTENSION: ICD-10-CM

## 2024-05-06 RX ORDER — VALSARTAN 320 MG/1
320 TABLET ORAL DAILY
Qty: 90 TABLET | Refills: 2 | Status: SHIPPED | OUTPATIENT
Start: 2024-05-06

## 2024-05-13 ENCOUNTER — PATIENT MESSAGE (OUTPATIENT)
Dept: PODIATRY | Facility: CLINIC | Age: 64
End: 2024-05-13
Payer: COMMERCIAL

## 2024-05-13 DIAGNOSIS — M72.2 PLANTAR FASCIITIS: Primary | ICD-10-CM

## 2024-05-13 DIAGNOSIS — M79.672 LEFT FOOT PAIN: ICD-10-CM

## 2024-05-16 ENCOUNTER — OFFICE (OUTPATIENT)
Dept: URBAN - METROPOLITAN AREA PATHOLOGY 2 | Facility: PATHOLOGY | Age: 64
End: 2024-05-16
Payer: COMMERCIAL

## 2024-05-16 ENCOUNTER — AMBULATORY SURGICAL CENTER (OUTPATIENT)
Dept: URBAN - METROPOLITAN AREA SURGERY 12 | Facility: SURGERY | Age: 64
End: 2024-05-16

## 2024-05-16 VITALS
HEART RATE: 67 BPM | HEART RATE: 54 BPM | SYSTOLIC BLOOD PRESSURE: 140 MMHG | TEMPERATURE: 97.6 F | RESPIRATION RATE: 14 BRPM | SYSTOLIC BLOOD PRESSURE: 138 MMHG | RESPIRATION RATE: 13 BRPM | TEMPERATURE: 97.6 F | HEIGHT: 72 IN | DIASTOLIC BLOOD PRESSURE: 80 MMHG | SYSTOLIC BLOOD PRESSURE: 138 MMHG | RESPIRATION RATE: 16 BRPM | RESPIRATION RATE: 16 BRPM | SYSTOLIC BLOOD PRESSURE: 140 MMHG | HEART RATE: 72 BPM | TEMPERATURE: 97.6 F | RESPIRATION RATE: 10 BRPM | SYSTOLIC BLOOD PRESSURE: 140 MMHG | HEART RATE: 54 BPM | HEART RATE: 72 BPM | HEART RATE: 61 BPM | OXYGEN SATURATION: 97 % | RESPIRATION RATE: 12 BRPM | WEIGHT: 290 LBS | RESPIRATION RATE: 13 BRPM | DIASTOLIC BLOOD PRESSURE: 91 MMHG | HEART RATE: 61 BPM | OXYGEN SATURATION: 96 % | HEART RATE: 61 BPM | SYSTOLIC BLOOD PRESSURE: 118 MMHG | RESPIRATION RATE: 16 BRPM | SYSTOLIC BLOOD PRESSURE: 123 MMHG | OXYGEN SATURATION: 97 % | SYSTOLIC BLOOD PRESSURE: 123 MMHG | RESPIRATION RATE: 12 BRPM | RESPIRATION RATE: 14 BRPM | DIASTOLIC BLOOD PRESSURE: 91 MMHG | DIASTOLIC BLOOD PRESSURE: 85 MMHG | RESPIRATION RATE: 12 BRPM | HEIGHT: 72 IN | WEIGHT: 290 LBS | WEIGHT: 290 LBS | SYSTOLIC BLOOD PRESSURE: 123 MMHG | DIASTOLIC BLOOD PRESSURE: 87 MMHG | OXYGEN SATURATION: 96 % | OXYGEN SATURATION: 93 % | SYSTOLIC BLOOD PRESSURE: 138 MMHG | HEART RATE: 67 BPM | OXYGEN SATURATION: 93 % | HEIGHT: 72 IN | OXYGEN SATURATION: 96 % | DIASTOLIC BLOOD PRESSURE: 87 MMHG | DIASTOLIC BLOOD PRESSURE: 91 MMHG | SYSTOLIC BLOOD PRESSURE: 118 MMHG | HEART RATE: 54 BPM | DIASTOLIC BLOOD PRESSURE: 85 MMHG | RESPIRATION RATE: 14 BRPM | DIASTOLIC BLOOD PRESSURE: 85 MMHG | SYSTOLIC BLOOD PRESSURE: 118 MMHG | RESPIRATION RATE: 10 BRPM | RESPIRATION RATE: 10 BRPM | OXYGEN SATURATION: 97 % | RESPIRATION RATE: 13 BRPM | DIASTOLIC BLOOD PRESSURE: 80 MMHG | DIASTOLIC BLOOD PRESSURE: 87 MMHG | OXYGEN SATURATION: 94 % | HEART RATE: 67 BPM | OXYGEN SATURATION: 94 % | DIASTOLIC BLOOD PRESSURE: 80 MMHG | OXYGEN SATURATION: 94 % | HEART RATE: 72 BPM | OXYGEN SATURATION: 93 %

## 2024-05-16 DIAGNOSIS — K31.89 OTHER DISEASES OF STOMACH AND DUODENUM: ICD-10-CM

## 2024-05-16 DIAGNOSIS — K44.9 DIAPHRAGMATIC HERNIA WITHOUT OBSTRUCTION OR GANGRENE: ICD-10-CM

## 2024-05-16 DIAGNOSIS — K22.70 BARRETT'S ESOPHAGUS WITHOUT DYSPLASIA: ICD-10-CM

## 2024-05-16 DIAGNOSIS — K21.00 GASTRO-ESOPHAGEAL REFLUX DISEASE WITH ESOPHAGITIS, WITHOUT B: ICD-10-CM

## 2024-05-16 DIAGNOSIS — K22.89 OTHER SPECIFIED DISEASE OF ESOPHAGUS: ICD-10-CM

## 2024-05-16 PROCEDURE — 88305 TISSUE EXAM BY PATHOLOGIST: CPT | Mod: TC | Performed by: PATHOLOGY

## 2024-05-16 PROCEDURE — 43239 EGD BIOPSY SINGLE/MULTIPLE: CPT | Performed by: INTERNAL MEDICINE

## 2024-05-16 PROCEDURE — 88313 SPECIAL STAINS GROUP 2: CPT | Mod: TC | Performed by: PATHOLOGY

## 2024-05-28 VITALS
RESPIRATION RATE: 15 BRPM | DIASTOLIC BLOOD PRESSURE: 43 MMHG | DIASTOLIC BLOOD PRESSURE: 51 MMHG | DIASTOLIC BLOOD PRESSURE: 49 MMHG | OXYGEN SATURATION: 94 % | DIASTOLIC BLOOD PRESSURE: 61 MMHG | HEART RATE: 60 BPM | SYSTOLIC BLOOD PRESSURE: 94 MMHG | HEART RATE: 68 BPM | TEMPERATURE: 97.3 F | OXYGEN SATURATION: 90 % | SYSTOLIC BLOOD PRESSURE: 89 MMHG | OXYGEN SATURATION: 91 % | SYSTOLIC BLOOD PRESSURE: 91 MMHG | OXYGEN SATURATION: 88 % | RESPIRATION RATE: 14 BRPM | SYSTOLIC BLOOD PRESSURE: 97 MMHG | SYSTOLIC BLOOD PRESSURE: 86 MMHG | HEIGHT: 72 IN | SYSTOLIC BLOOD PRESSURE: 82 MMHG | HEART RATE: 58 BPM | SYSTOLIC BLOOD PRESSURE: 93 MMHG | DIASTOLIC BLOOD PRESSURE: 49 MMHG | HEART RATE: 57 BPM | HEART RATE: 68 BPM | DIASTOLIC BLOOD PRESSURE: 49 MMHG | DIASTOLIC BLOOD PRESSURE: 47 MMHG | DIASTOLIC BLOOD PRESSURE: 45 MMHG | HEART RATE: 53 BPM | OXYGEN SATURATION: 93 % | DIASTOLIC BLOOD PRESSURE: 61 MMHG | RESPIRATION RATE: 15 BRPM | RESPIRATION RATE: 23 BRPM | HEART RATE: 57 BPM | DIASTOLIC BLOOD PRESSURE: 52 MMHG | RESPIRATION RATE: 9 BRPM | HEART RATE: 63 BPM | SYSTOLIC BLOOD PRESSURE: 86 MMHG | OXYGEN SATURATION: 90 % | HEART RATE: 53 BPM | HEART RATE: 68 BPM | DIASTOLIC BLOOD PRESSURE: 78 MMHG | OXYGEN SATURATION: 94 % | SYSTOLIC BLOOD PRESSURE: 107 MMHG | SYSTOLIC BLOOD PRESSURE: 113 MMHG | HEART RATE: 75 BPM | HEART RATE: 58 BPM | RESPIRATION RATE: 10 BRPM | SYSTOLIC BLOOD PRESSURE: 93 MMHG | HEART RATE: 67 BPM | RESPIRATION RATE: 8 BRPM | OXYGEN SATURATION: 95 % | RESPIRATION RATE: 10 BRPM | SYSTOLIC BLOOD PRESSURE: 94 MMHG | RESPIRATION RATE: 12 BRPM | HEART RATE: 60 BPM | DIASTOLIC BLOOD PRESSURE: 54 MMHG | RESPIRATION RATE: 12 BRPM | HEART RATE: 75 BPM | SYSTOLIC BLOOD PRESSURE: 107 MMHG | DIASTOLIC BLOOD PRESSURE: 47 MMHG | SYSTOLIC BLOOD PRESSURE: 97 MMHG | DIASTOLIC BLOOD PRESSURE: 61 MMHG | SYSTOLIC BLOOD PRESSURE: 98 MMHG | OXYGEN SATURATION: 91 % | DIASTOLIC BLOOD PRESSURE: 52 MMHG | DIASTOLIC BLOOD PRESSURE: 67 MMHG | OXYGEN SATURATION: 94 % | HEART RATE: 48 BPM | DIASTOLIC BLOOD PRESSURE: 54 MMHG | HEART RATE: 75 BPM | SYSTOLIC BLOOD PRESSURE: 98 MMHG | SYSTOLIC BLOOD PRESSURE: 93 MMHG | SYSTOLIC BLOOD PRESSURE: 91 MMHG | OXYGEN SATURATION: 91 % | HEART RATE: 60 BPM | DIASTOLIC BLOOD PRESSURE: 51 MMHG | OXYGEN SATURATION: 95 % | DIASTOLIC BLOOD PRESSURE: 67 MMHG | OXYGEN SATURATION: 90 % | SYSTOLIC BLOOD PRESSURE: 87 MMHG | RESPIRATION RATE: 14 BRPM | DIASTOLIC BLOOD PRESSURE: 63 MMHG | SYSTOLIC BLOOD PRESSURE: 89 MMHG | HEART RATE: 61 BPM | HEIGHT: 72 IN | SYSTOLIC BLOOD PRESSURE: 123 MMHG | RESPIRATION RATE: 15 BRPM | SYSTOLIC BLOOD PRESSURE: 82 MMHG | HEART RATE: 64 BPM | RESPIRATION RATE: 9 BRPM | TEMPERATURE: 97.3 F | HEART RATE: 65 BPM | SYSTOLIC BLOOD PRESSURE: 107 MMHG | TEMPERATURE: 97.3 F | DIASTOLIC BLOOD PRESSURE: 45 MMHG | DIASTOLIC BLOOD PRESSURE: 78 MMHG | DIASTOLIC BLOOD PRESSURE: 43 MMHG | HEART RATE: 61 BPM | RESPIRATION RATE: 8 BRPM | HEART RATE: 57 BPM | DIASTOLIC BLOOD PRESSURE: 47 MMHG | HEART RATE: 63 BPM | DIASTOLIC BLOOD PRESSURE: 63 MMHG | OXYGEN SATURATION: 95 % | RESPIRATION RATE: 12 BRPM | HEART RATE: 53 BPM | HEART RATE: 64 BPM | DIASTOLIC BLOOD PRESSURE: 51 MMHG | SYSTOLIC BLOOD PRESSURE: 87 MMHG | SYSTOLIC BLOOD PRESSURE: 98 MMHG | HEART RATE: 63 BPM | RESPIRATION RATE: 13 BRPM | DIASTOLIC BLOOD PRESSURE: 43 MMHG | OXYGEN SATURATION: 88 % | OXYGEN SATURATION: 92 % | OXYGEN SATURATION: 92 % | DIASTOLIC BLOOD PRESSURE: 54 MMHG | OXYGEN SATURATION: 88 % | SYSTOLIC BLOOD PRESSURE: 113 MMHG | HEART RATE: 65 BPM | RESPIRATION RATE: 9 BRPM | OXYGEN SATURATION: 92 % | HEART RATE: 65 BPM | OXYGEN SATURATION: 93 % | RESPIRATION RATE: 8 BRPM | HEART RATE: 61 BPM | SYSTOLIC BLOOD PRESSURE: 97 MMHG | HEIGHT: 72 IN | DIASTOLIC BLOOD PRESSURE: 67 MMHG | SYSTOLIC BLOOD PRESSURE: 89 MMHG | DIASTOLIC BLOOD PRESSURE: 45 MMHG | SYSTOLIC BLOOD PRESSURE: 94 MMHG | SYSTOLIC BLOOD PRESSURE: 123 MMHG | HEART RATE: 67 BPM | RESPIRATION RATE: 10 BRPM | DIASTOLIC BLOOD PRESSURE: 63 MMHG | DIASTOLIC BLOOD PRESSURE: 78 MMHG | HEART RATE: 58 BPM | SYSTOLIC BLOOD PRESSURE: 91 MMHG | WEIGHT: 290 LBS | WEIGHT: 290 LBS | WEIGHT: 290 LBS | HEART RATE: 64 BPM | DIASTOLIC BLOOD PRESSURE: 52 MMHG | SYSTOLIC BLOOD PRESSURE: 82 MMHG | RESPIRATION RATE: 23 BRPM | SYSTOLIC BLOOD PRESSURE: 87 MMHG | RESPIRATION RATE: 13 BRPM | HEART RATE: 67 BPM | RESPIRATION RATE: 23 BRPM | SYSTOLIC BLOOD PRESSURE: 113 MMHG | SYSTOLIC BLOOD PRESSURE: 86 MMHG | OXYGEN SATURATION: 93 % | HEART RATE: 48 BPM | RESPIRATION RATE: 14 BRPM | HEART RATE: 48 BPM | RESPIRATION RATE: 13 BRPM | SYSTOLIC BLOOD PRESSURE: 123 MMHG

## 2024-06-03 ENCOUNTER — OFFICE VISIT (OUTPATIENT)
Dept: PODIATRY | Facility: CLINIC | Age: 64
End: 2024-06-03
Payer: COMMERCIAL

## 2024-06-03 DIAGNOSIS — M72.2 PLANTAR FASCIITIS: Primary | ICD-10-CM

## 2024-06-03 DIAGNOSIS — M79.672 LEFT FOOT PAIN: ICD-10-CM

## 2024-06-03 PROBLEM — Z86.010 SURVEILLANCE DUE TO PRIOR COLONIC NEOPLASIA: Status: ACTIVE | Noted: 2024-06-04

## 2024-06-03 PROCEDURE — 3008F BODY MASS INDEX DOCD: CPT | Performed by: PODIATRIST

## 2024-06-03 PROCEDURE — 99212 OFFICE O/P EST SF 10 MIN: CPT | Performed by: PODIATRIST

## 2024-06-03 PROCEDURE — 20550 NJX 1 TENDON SHEATH/LIGAMENT: CPT | Performed by: PODIATRIST

## 2024-06-03 RX ORDER — TRIAMCINOLONE ACETONIDE 40 MG/ML
20 INJECTION, SUSPENSION INTRA-ARTICULAR; INTRAMUSCULAR ONCE
Status: COMPLETED | OUTPATIENT
Start: 2024-06-03 | End: 2024-06-03

## 2024-06-03 RX ADMIN — TRIAMCINOLONE ACETONIDE 20 MG: 40 INJECTION, SUSPENSION INTRA-ARTICULAR; INTRAMUSCULAR at 12:18

## 2024-06-03 NOTE — PROGRESS NOTES
CC: left heel pain follow up     HPI:  Patient presents follow up left  heel pain  x 3 months Pain is mild and rated as 3-4/10. Pain is present with initial step and after sitting, denies trauma.   Denies any swelling or redness. Doing the hep and price, starts therapy on Thursday, wants injection left heel also here to  orthotics.     PCP: Dr. Cardenas  Last visit: 2-20-24      PMH  Medical History           Past Medical History:   Diagnosis Date    Left lower quadrant pain 07/13/2019     Left groin pain    Personal history of other diseases of the circulatory system 01/03/2020     History of hypertension    Personal history of other diseases of the digestive system 04/08/2019     History of diverticulitis of colon    Personal history of other diseases of the musculoskeletal system and connective tissue 04/08/2019     History of backache    Personal history of other diseases of the respiratory system 03/21/2018     History of acute bronchitis    Personal history of other endocrine, nutritional and metabolic disease 01/03/2020     History of morbid obesity    Personal history of other specified conditions       History of abdominal pain         MEDS     Current Outpatient Medications:     hydroCHLOROthiazide (HYDRODiuril) 25 mg tablet, Take 1 tablet (25 mg) by mouth once daily., Disp: 90 tablet, Rfl: 2    loratadine (Claritin) 10 mg tablet, TAKE 1 TABLET BY MOUTH EVERY DAY, Disp: 90 tablet, Rfl: 1    pantoprazole (ProtoNix) 40 mg EC tablet, Take 1 tablet (40 mg) by mouth once daily., Disp: , Rfl:     psyllium (Metamucil) 3.4 gram packet, Take 1 Dose by mouth in the morning and 1 Dose before bedtime., Disp: , Rfl:     sildenafil (Viagra) 50 mg tablet, Take 1 tablet (50 mg) by mouth. TAKE 1 TABLET DAILY 1 HOUR BEFORE NEEDED, Disp: , Rfl:     valsartan (Diovan) 320 mg tablet, Take 1 tablet (320 mg) by mouth once daily., Disp: 90 tablet, Rfl: 2  Allergies  No Known Allergies  Social History   Social History                 Socioeconomic History    Marital status:        Spouse name: Not on file    Number of children: Not on file    Years of education: Not on file    Highest education level: Not on file   Occupational History    Not on file   Tobacco Use    Smoking status: Never       Passive exposure: Never    Smokeless tobacco: Never   Substance and Sexual Activity    Alcohol use: Defer    Drug use: Never    Sexual activity: Not on file   Other Topics Concern    Not on file   Social History Narrative    Not on file      Social Determinants of Health      Financial Resource Strain: Not on file   Food Insecurity: Not on file   Transportation Needs: Not on file   Physical Activity: Not on file   Stress: Not on file   Social Connections: Not on file   Intimate Partner Violence: Not on file   Housing Stability: Not on file         Family History               Family History   Problem Relation Name Age of Onset    Other (cardiac disorder) Father        Hyperlipidemia Father        Hypertension Father        Lung cancer Paternal Grandmother        Lung cancer Paternal Grandfather             Surgical History             Past Surgical History:   Procedure Laterality Date    OTHER SURGICAL HISTORY   01/03/2020     Colonoscopy            REVIEW OF SYSTEMS     Musculoskeletal: + as noted in HPI.         Physical examination:   On General Observation: Patient is a pleasant, cooperative, well developed 63 y.o.  adult male. The patient is alert and oriented to time, place and person. Patient has normal affect and mood.  There were no vitals taken for this visit.     Vascular:  DP and PT pulses are 2/4 b/l.  Mild edema to noted to left heel.       Muscular: Strength is 5/5 for all instrinsic and extrinsic muscle groups with exception of ankle dorsiflexion and plantarflexion d/t guarding.  Pain with palpation to the plantar medial aspect of the left heel at the insertion of the plantar medial calcaneal tubercle.  No signs of calcaneal  stress fracture.       Neuro:   Light touch present bilateral.      Derm:   Skin texture and turgor within normal limits.     No rashes, subcutaneous nodules, or open lesions noted.  No hyperkeratotic tissue. No erythema     Ortho:  Pain is present to the medial band of the plantar fascia, supination of the foot is present, ROM is stable 1st mpj, stj, aj b/l le     XRAYS:  Narrative & Impression   Interpreted By:  Eleuterio Li,   STUDY:  XR FOOT LEFT 3+ VIEWS  4/9/2024 8:18 am      INDICATION:  Signs/Symptoms:heel pain      COMPARISON:  None.      ACCESSION NUMBER(S):  [id number]      ORDERING CLINICIAN:  EMILIA CONDE      TECHNIQUE:  Three views of the left foot including AP , oblique and lateral  projections were obtained.      FINDINGS:  There is no radiographic evidence of acute fracture or dislocation  identified. The joint spaces are well preserved throughout without  significant degenerative changes.      IMPRESSION:  1. No acute fracture or dislocation identified.          ASSESSMENT:    Plantar fasciitis left  Pain left foot        PLAN:      Exam  Reviewed with pt, dispensed orthotics with oral and written intructions, follow up 4 weeks if any issues  Injection done today with 1cc 2% lidocaine plain, 1cc 0.5% marcaine plain, 0.5cc kenalog 40, 0.5cc dexamethasone no complications.     Emilia Conde DPM

## 2024-06-04 ENCOUNTER — AMBULATORY SURGICAL CENTER (OUTPATIENT)
Dept: URBAN - METROPOLITAN AREA SURGERY 12 | Facility: SURGERY | Age: 64
End: 2024-06-04
Payer: COMMERCIAL

## 2024-06-04 DIAGNOSIS — K64.8 OTHER HEMORRHOIDS: ICD-10-CM

## 2024-06-04 DIAGNOSIS — Z86.010 PERSONAL HISTORY OF COLONIC POLYPS: ICD-10-CM

## 2024-06-04 DIAGNOSIS — Z09 ENCOUNTER FOR FOLLOW-UP EXAMINATION AFTER COMPLETED TREATMEN: ICD-10-CM

## 2024-06-04 DIAGNOSIS — K60.2 ANAL FISSURE, UNSPECIFIED: ICD-10-CM

## 2024-06-04 DIAGNOSIS — K57.30 DIVERTICULOSIS OF LARGE INTESTINE WITHOUT PERFORATION OR ABS: ICD-10-CM

## 2024-06-04 PROCEDURE — 45378 DIAGNOSTIC COLONOSCOPY: CPT | Performed by: INTERNAL MEDICINE

## 2024-06-05 NOTE — PROGRESS NOTES
Physical Therapy    Physical Therapy Lower Extremity Evaluation    Patient Name: Blaine Méndez  MRN: [id number]  Today's Date: 6/6/2024  Time Calculation  Start Time: 0915  Stop Time: 0956  Time Calculation (min): 41 min  PT Evaluation Time Entry  PT Evaluation (Low) Time Entry: 28  PT Therapeutic Procedures Time Entry  Therapeutic Exercise Time Entry: 5  PT Modalities Time Entry  Ultrasound Time Entry: 8              Payor: [facility] / Plan: [facility] / Product Type: *No Product type* /     Reason for Referral: L heel, plantar fasciitis  General Comment: Visit 1 of [place]    Current Problem  Problem List Items Addressed This Visit             ICD-10-CM    Left foot pain - Primary M79.672    Relevant Orders    Follow Up In Physical Therapy    Plantar fasciitis M72.2    Relevant Orders    Follow Up In Physical Therapy        Precautions  Precautions  Precautions Comment: None       Pain  Pain Assessment: 0-10  Pain Score: 0 - No pain  Pain at worst: 4/10    SUBJECTIVE:   Pain location: L heel, plantar fasciitis    Injection 6/3/24  Custom orthotics were also picked up on 6/3/24  Overall symptoms are improved since onset     Walks 4-6 miles a day     Onset: Ongoing since February    Reviewed medical hx form     Aggravating factors:  Walking long distances   First thing in the morning   After sitting for awhile and then going to stand up    Alleviating factors:    Imaging:  XR 4/9/24 L foot     FINDINGS:  There is no radiographic evidence of acute fracture or dislocation  identified. The joint spaces are well preserved throughout without  significant degenerative changes.      IMPRESSION:  1. No acute fracture or dislocation identified.    Prior level of function:   Previously independent with all activity     Functional limitations:  Walking longer distances     Home setup:  Reviewed and no concern     Work:       Patient stated goal:  Decrease pain and increase walking  distances    Prior tx:  Injection     OBJECTIVE:    Lower Extremity ROM: (WNL unless documented below) (p=pain)   ROM in Degrees  RIGHT LEFT   Ankle DF 5 3 p in heel    Ankle PF 60 50 p in heel    Ankle INV 40 40   Ankle EV 9 10     Lower Extremity STRENGTH: (WNL unless documented below) (p=pain)   MMT 5/5 max  RIGHT LEFT   Ankle DF 5 5   Ankle PF 5 4+   Ankle INV 5 5   Ankle EV 5 4+     Heel raises on L more challenging to perform compared to R.     Lower Extremity FLEXIBILITY: (WNL unless documented below) (p=pain)  Flexibility  RIGHT LEFT   Quad     Hamstring      Gastroc      Soleus        -windlass test    Balance: 5 sec bilaterally with increased ankle righting on the L   Posture: mild pes planus   Palpation: Non TTP currently however pt noted it is usually tender in proximal aspect of calcaneus    Outcome Measure:  Other Measures  Lower Extremity Funtional Score (LEFS): 61/80    TREATMENT:  Initial evaluation completed. Issued and reviewed HEP with pt that included:  Access Code: [email or web address]  URL: [email or web address]  Date: 06/06/2024  Prepared by: Dolores Malone    Exercises  - Standing Gastroc Stretch  - 2 x daily - 7 x weekly - 2 reps - 30 seconds hold  - Standing Soleus Stretch  - 2 x daily - 7 x weekly - 2 reps - 30 second hold  - Standing Toe Dorsiflexion Stretch  - 2 x daily - 7 x weekly - 2 reps - 30 seconds hold  - Foot Roller Plantar Massage  - 1 x daily - 7 x weekly - 2-3 minute time   - Eccentric Plantar Fascia Strengthening on Step  - 1 x daily - 7 x weekly - 2 sets - 10 reps    Patient Education  - Plantar Fasciitis    US to L plantar fascia 3 MHz, .5 w/cm2 x 8 min, cont    ASSESSEMENT  The pt presents with signs and symptoms consistent with the physical therapy diagnosis of L heel pain   Pt demonstrates decreased L ankle ROM and strength with some pain reported. Symptoms consistent with plantar fasciitis. He tolerated tx well today. The pt will benefit from skilled  physical therapy to reduce impairments in order to return to prior level of function, reduce pain, increase strength and ROM and restore gait/balance.     The physical therapy prognosis is good for the patient to achieve their goals.   The pt tolerated therapy treatment today well with no adverse effects.    Personal factors/barriers to learning that impact therapy include:  None  Clinical presentation: Stable and/or uncomplicated characteristics  Level of clinical decision making is Low    PLAN  The pt will be seen 1 time(s) a week for 5 weeks.      The pt has been educated about the risks and benefits of physical therapy including manual therapy treatments and gives consent for treatment.     The patient will benefit from physical therapy treatment to include: therapeutic exercises, therapeutic activities, neurological re-education, manual therapy, modalities, and a home exercise program.     Goals:  Active       PT Problem       Reduce pain at worst to 2/10 with all functional and recreational activity.        Start:  06/06/24    Expected End:  09/04/24            Increase ROM/flexibility to WFL to perform daily functional activities including amb regardless of terrain, stair negotiation        Start:  06/06/24    Expected End:  09/04/24            Increase by > or = 1/2 mm grade to improve amb on even and uneven terrain, stair negotiation, transfers to perform daily tasks without increased pain/compensation         Start:  06/06/24    Expected End:  09/04/24            Pt to score an increase of 10 points or > on LEFS to display overall increased function.         Start:  06/06/24    Expected End:  09/04/24            Patient will demonstrate independence in home program for support of progression       Start:  06/06/24    Expected End:  09/04/24

## 2024-06-06 ENCOUNTER — EVALUATION (OUTPATIENT)
Dept: PHYSICAL THERAPY | Facility: CLINIC | Age: 64
End: 2024-06-06
Payer: COMMERCIAL

## 2024-06-06 DIAGNOSIS — M72.2 PLANTAR FASCIITIS: ICD-10-CM

## 2024-06-06 DIAGNOSIS — M79.672 LEFT FOOT PAIN: Primary | ICD-10-CM

## 2024-06-06 PROCEDURE — 97035 APP MDLTY 1+ULTRASOUND EA 15: CPT | Mod: GP | Performed by: PHYSICAL THERAPIST

## 2024-06-06 PROCEDURE — 97161 PT EVAL LOW COMPLEX 20 MIN: CPT | Mod: GP | Performed by: PHYSICAL THERAPIST

## 2024-06-06 ASSESSMENT — ENCOUNTER SYMPTOMS
DEPRESSION: 0
OCCASIONAL FEELINGS OF UNSTEADINESS: 0
LOSS OF SENSATION IN FEET: 0

## 2024-06-06 ASSESSMENT — PATIENT HEALTH QUESTIONNAIRE - PHQ9
SUM OF ALL RESPONSES TO PHQ9 QUESTIONS 1 AND 2: 0
1. LITTLE INTEREST OR PLEASURE IN DOING THINGS: NOT AT ALL
2. FEELING DOWN, DEPRESSED OR HOPELESS: NOT AT ALL

## 2024-06-06 ASSESSMENT — PAIN - FUNCTIONAL ASSESSMENT: PAIN_FUNCTIONAL_ASSESSMENT: 0-10

## 2024-06-06 ASSESSMENT — PAIN SCALES - GENERAL: PAINLEVEL_OUTOF10: 0 - NO PAIN

## 2024-06-12 ENCOUNTER — TREATMENT (OUTPATIENT)
Dept: PHYSICAL THERAPY | Facility: CLINIC | Age: 64
End: 2024-06-12
Payer: COMMERCIAL

## 2024-06-12 DIAGNOSIS — M79.672 LEFT FOOT PAIN: ICD-10-CM

## 2024-06-12 DIAGNOSIS — M72.2 PLANTAR FASCIITIS: ICD-10-CM

## 2024-06-12 PROCEDURE — 97035 APP MDLTY 1+ULTRASOUND EA 15: CPT | Mod: GP,CQ

## 2024-06-12 PROCEDURE — 97110 THERAPEUTIC EXERCISES: CPT | Mod: GP,CQ

## 2024-06-12 PROCEDURE — 97140 MANUAL THERAPY 1/> REGIONS: CPT | Mod: GP,CQ

## 2024-06-12 ASSESSMENT — PAIN - FUNCTIONAL ASSESSMENT: PAIN_FUNCTIONAL_ASSESSMENT: 0-10

## 2024-06-12 ASSESSMENT — PAIN SCALES - GENERAL: PAINLEVEL_OUTOF10: 0 - NO PAIN

## 2024-06-12 NOTE — PROGRESS NOTES
Physical Therapy Treatment    Patient Name: Blaine Méndez  MRN: [id number]  Today's Date: 6/12/2024  Visit #2  Time Calculation  Start Time: 0830  Stop Time: 0915  Time Calculation (min): 45 min     PT Therapeutic Procedures Time Entry  Manual Therapy Time Entry: 15  Therapeutic Exercise Time Entry: 20  PT Modalities Time Entry  Ultrasound Time Entry: 10              Payor: [facility] / Plan: [facility] / Product Type: *No Product type* /   Rodrick Valera  Reason for Referral: L heel, plantar fasciitis  General Comment: Visit 2 of MN      Current Problem:  Problem List Items Addressed This Visit             ICD-10-CM    Left foot pain M79.672    Plantar fasciitis M72.2       Subjective   General:  L foot has improved since starting HEP and injection.  Custom orthotics were also picked up on 6/3/24.   HEP Compliance:  yes    Patient stated goal:  Decrease pain and increase walking distances    Pain:  Pain Assessment: 0-10  Pain Score: 0 - No pain  Pain Location: Foot  Pain Orientation: Left    Precautions:  Precautions  Precautions Comment: None    Objective   No objective measures taken this visit  TTP along L proximal aspect of calcaneus    Treatment:  Therapeutic exercise  Nustep L4 x 5 min( seat 13)  Fitter board gastroc/soleus stretch x1 min ea  HR on 1/2 foam 2x10   HR with knee's bent 2x10   L reverse step up /down 6'' 2x10     Neuromuscular Re-education       Manual   TPR to calcaneus, calf , plantar fascia  Manual stretching to calf, plantar fascia     Informed consent given on manual treatment. Pt given opportunity to cease treatment at any time. Educated pt on expected soreness, possible ecchymosis. Pt voiced understanding  No adverse effects were noted post tx.      Modalities  US to L plantar fascia 3 MHz, .5 w/cm2 x 10 min, cont  Assessment   Pt tolerated session well without complaints of pain. Hesitancy with retro step ups but improved as reps went. Ttp along calf,  medial calcaneus and plantar fascia. Tolerated well with improved tissue mobility following. US to promote healing. Pt to cont HEP.     Plan    Continue to progress POC as tolerated by patient to improve strength, mobility and overall function    HEP:  Access Code: [email or web address]  URL: [email or web address]  Date: 06/06/2024  Prepared by: Dolores Malone    Exercises  - Standing Gastroc Stretch  - 2 x daily - 7 x weekly - 2 reps - 30 seconds hold  - Standing Soleus Stretch  - 2 x daily - 7 x weekly - 2 reps - 30 second hold  - Standing Toe Dorsiflexion Stretch  - 2 x daily - 7 x weekly - 2 reps - 30 seconds hold  - Foot Roller Plantar Massage  - 1 x daily - 7 x weekly - 2-3 minute time   - Eccentric Plantar Fascia Strengthening on Step  - 1 x daily - 7 x weekly - 2 sets - 10 reps    Patient Education  - Plantar Fasciitis      Goals:  Active       PT Problem       Reduce pain at worst to 2/10 with all functional and recreational activity.        Start:  06/06/24    Expected End:  09/04/24            Increase ROM/flexibility to WFL to perform daily functional activities including amb regardless of terrain, stair negotiation        Start:  06/06/24    Expected End:  09/04/24            Increase by > or = 1/2 mm grade to improve amb on even and uneven terrain, stair negotiation, transfers to perform daily tasks without increased pain/compensation         Start:  06/06/24    Expected End:  09/04/24            Pt to score an increase of 10 points or > on LEFS to display overall increased function.         Start:  06/06/24    Expected End:  09/04/24            Patient will demonstrate independence in home program for support of progression       Start:  06/06/24    Expected End:  09/04/24

## 2024-06-17 ENCOUNTER — TREATMENT (OUTPATIENT)
Dept: PHYSICAL THERAPY | Facility: CLINIC | Age: 64
End: 2024-06-17
Payer: COMMERCIAL

## 2024-06-17 DIAGNOSIS — M79.672 LEFT FOOT PAIN: ICD-10-CM

## 2024-06-17 DIAGNOSIS — M72.2 PLANTAR FASCIITIS: Primary | ICD-10-CM

## 2024-06-17 DIAGNOSIS — M72.2 PLANTAR FASCIITIS: ICD-10-CM

## 2024-06-17 PROCEDURE — 97035 APP MDLTY 1+ULTRASOUND EA 15: CPT | Mod: GP,CQ

## 2024-06-17 PROCEDURE — 97110 THERAPEUTIC EXERCISES: CPT | Mod: GP,CQ

## 2024-06-17 PROCEDURE — 97140 MANUAL THERAPY 1/> REGIONS: CPT | Mod: GP,CQ

## 2024-06-17 ASSESSMENT — PAIN - FUNCTIONAL ASSESSMENT: PAIN_FUNCTIONAL_ASSESSMENT: 0-10

## 2024-06-17 ASSESSMENT — PAIN SCALES - GENERAL: PAINLEVEL_OUTOF10: 4

## 2024-06-17 NOTE — PROGRESS NOTES
Physical Therapy Treatment    Patient Name: Blaine Méndez  MRN: [id number]  Today's Date: 6/17/2024  Visit #3  Time Calculation  Start Time: 1705  Stop Time: 1745  Time Calculation (min): 40 min     PT Therapeutic Procedures Time Entry  Manual Therapy Time Entry: 10  Therapeutic Exercise Time Entry: 20  PT Modalities Time Entry  Ultrasound Time Entry: 10              Payor: [facility] / Plan: [facility] / Product Type: *No Product type* /   Rodrick Valera  Reason for Referral: L heel, plantar fasciitis  General Comment: Visit 3 of [place]      Current Problem:  Problem List Items Addressed This Visit             ICD-10-CM    Left foot pain M79.672    Plantar fasciitis - Primary M72.2         Subjective   General:  Pt reports he walked for a few hrs this AM with his dog and experienced lateral arch pain by the end.   Overall improved, is not experiencing as much pain in the AM.   HEP Compliance:  yes    Patient stated goal:  Decrease pain and increase walking distances    Pain:  Pain Assessment: 0-10  Pain Score: 4  Pain Location: Foot  Pain Orientation: Left    Precautions:  Precautions  Precautions Comment: None    Objective   No objective measures taken this visit  TTP along L proximal aspect of calcaneus    Treatment:  Therapeutic exercise  Nustep L4 x 5 min( seat 13)/Upright bike L3 x 5 min   Fitter board gastroc/soleus stretch x1 min ea  HR on EOS 2x10   HR with knee's bent 2x10   L reverse step up /down 6'' 2x10   Arch lift BL 2 sec 2x10   AE tandem x1 min BL     Neuromuscular Re-education       Manual   TPR to calcaneus, calf , plantar fascia  Manual stretching to calf, plantar fascia     Informed consent given on manual treatment. Pt given opportunity to cease treatment at any time. Educated pt on expected soreness, possible ecchymosis. Pt voiced understanding  No adverse effects were noted post tx.      Modalities  US to L plantar fascia 3 MHz, .5 w/cm2 x 10 min,  cont    Assessment   Pt tolerated session well. Did well with addition of arch lifts and AE tandem, voicing fatigue in plantar fascia.   TTP along lateral aspect of arch and calf. Responded well with improved tissue mobility following. US to promote healing post tx.     Plan    Continue to progress POC as tolerated by patient to improve strength, mobility and overall function    HEP:  Access Code: [email or web address]  URL: [email or web address]  Date: 06/17/2024  Prepared by: Dolores Bridges    Exercises  - Standing Gastroc Stretch  - 2 x daily - 7 x weekly - 2 reps - 30 seconds hold  - Standing Soleus Stretch  - 2 x daily - 7 x weekly - 2 reps - 30 second hold  - Standing Toe Dorsiflexion Stretch  - 2 x daily - 7 x weekly - 2 reps - 30 seconds hold  - Foot Roller Plantar Massage  - 1 x daily - 7 x weekly - 2-3 minute time   - Eccentric Plantar Fascia Strengthening on Step  - 1 x daily - 7 x weekly - 2 sets - 10 reps  - Standing Bilateral Heel Raise on Step  - 1 x daily - 7 x weekly - 2 sets - 10 reps  - Arch Lifting  - 1 x daily - 7 x weekly - 2 sets - 10 reps - 2 sec hold    Patient Education  - Plantar Fasciitis      Goals:  Active       PT Problem       Reduce pain at worst to 2/10 with all functional and recreational activity.        Start:  06/06/24    Expected End:  09/04/24            Increase ROM/flexibility to WFL to perform daily functional activities including amb regardless of terrain, stair negotiation        Start:  06/06/24    Expected End:  09/04/24            Increase by > or = 1/2 mm grade to improve amb on even and uneven terrain, stair negotiation, transfers to perform daily tasks without increased pain/compensation         Start:  06/06/24    Expected End:  09/04/24            Pt to score an increase of 10 points or > on LEFS to display overall increased function.         Start:  06/06/24    Expected End:  09/04/24            Patient will demonstrate independence in home program for  support of progression       Start:  06/06/24    Expected End:  09/04/24

## 2024-06-26 ENCOUNTER — TREATMENT (OUTPATIENT)
Dept: PHYSICAL THERAPY | Facility: CLINIC | Age: 64
End: 2024-06-26
Payer: COMMERCIAL

## 2024-06-26 DIAGNOSIS — M79.672 LEFT FOOT PAIN: ICD-10-CM

## 2024-06-26 DIAGNOSIS — M72.2 PLANTAR FASCIITIS: ICD-10-CM

## 2024-06-26 PROCEDURE — 97110 THERAPEUTIC EXERCISES: CPT | Mod: GP | Performed by: PHYSICAL THERAPIST

## 2024-06-26 PROCEDURE — 97035 APP MDLTY 1+ULTRASOUND EA 15: CPT | Mod: GP | Performed by: PHYSICAL THERAPIST

## 2024-06-26 ASSESSMENT — PAIN SCALES - GENERAL: PAINLEVEL_OUTOF10: 0 - NO PAIN

## 2024-06-26 NOTE — PROGRESS NOTES
Physical Therapy Treatment    Patient Name: Blaine Méndez  MRN: [id number]  Today's Date: 6/26/2024  Visit #4  Time Calculation  Start Time: 0918  Stop Time: 1002  Time Calculation (min): 44 min     PT Therapeutic Procedures Time Entry  Manual Therapy Time Entry: 7  Therapeutic Exercise Time Entry: 29  PT Modalities Time Entry  Ultrasound Time Entry: 8              Payor: [facility] / Plan: [facility] / Product Type: *No Product type* /   Rodrick Valera  Reason for Referral: L heel, plantar fasciitis  General Comment: Visit 4 of MN      Current Problem:  Problem List Items Addressed This Visit             ICD-10-CM    Left foot pain M79.672    Plantar fasciitis M72.2           Subjective   General:  Pt reports his foot/heel has been doing well this week. He cleaned out his camper.   He notes that he made a conscious effort to watch his gait mechanics.   Overall his pain is much better  He was able to walk 8 miles yesterday.     HEP Compliance:  yes    Patient stated goal:  Decrease pain and increase walking distances    Pain:  0-10 (Numeric) Pain Score: 0 - No pain    Precautions:  Precautions  Precautions Comment: None    Objective   No objective measures taken this visit  TTP along L proximal aspect of calcaneus    Treatment:  Therapeutic exercise  Nustep L4 x 5 min( seat 13)/Upright bike L3 x 5 min   Fitter board gastroc/soleus stretch x1 min ea  HR on EOS 2x10 eccentric control   HR with knee's bent 2x10   L reverse step up /down 6'' 2x10   Arch lift BL 2 sec 2x10   AE tandem x1 min BL       Manual   TPR to calcaneus, calf , plantar fascia  Manual stretching to calf, plantar fascia     Informed consent given on manual treatment. Pt given opportunity to cease treatment at any time. Educated pt on expected soreness, possible ecchymosis. Pt voiced understanding  No adverse effects were noted post tx.      Modalities  US to L plantar fascia 3 MHz, .5 w/cm2 x 10 min,  cont    Assessment  Pt noted fatigue with eccentric control with heel raises. TTP over lateral aspect of L foot with manual tx.     Plan    Continue to progress POC as tolerated by patient to improve strength, mobility and overall function    HEP:  Access Code: [email or web address]  URL: [email or web address]  Date: 06/17/2024  Prepared by: Dolores Bridges    Exercises  - Standing Gastroc Stretch  - 2 x daily - 7 x weekly - 2 reps - 30 seconds hold  - Standing Soleus Stretch  - 2 x daily - 7 x weekly - 2 reps - 30 second hold  - Standing Toe Dorsiflexion Stretch  - 2 x daily - 7 x weekly - 2 reps - 30 seconds hold  - Foot Roller Plantar Massage  - 1 x daily - 7 x weekly - 2-3 minute time   - Eccentric Plantar Fascia Strengthening on Step  - 1 x daily - 7 x weekly - 2 sets - 10 reps  - Standing Bilateral Heel Raise on Step  - 1 x daily - 7 x weekly - 2 sets - 10 reps  - Arch Lifting  - 1 x daily - 7 x weekly - 2 sets - 10 reps - 2 sec hold    Patient Education  - Plantar Fasciitis      Goals:  Active       PT Problem       Reduce pain at worst to 2/10 with all functional and recreational activity.        Start:  06/06/24    Expected End:  09/04/24            Increase ROM/flexibility to WFL to perform daily functional activities including amb regardless of terrain, stair negotiation        Start:  06/06/24    Expected End:  09/04/24            Increase by > or = 1/2 mm grade to improve amb on even and uneven terrain, stair negotiation, transfers to perform daily tasks without increased pain/compensation         Start:  06/06/24    Expected End:  09/04/24            Pt to score an increase of 10 points or > on LEFS to display overall increased function.         Start:  06/06/24    Expected End:  09/04/24            Patient will demonstrate independence in home program for support of progression       Start:  06/06/24    Expected End:  09/04/24

## 2024-07-01 ENCOUNTER — TREATMENT (OUTPATIENT)
Dept: PHYSICAL THERAPY | Facility: CLINIC | Age: 64
End: 2024-07-01
Payer: COMMERCIAL

## 2024-07-01 DIAGNOSIS — M79.672 LEFT FOOT PAIN: ICD-10-CM

## 2024-07-01 DIAGNOSIS — M72.2 PLANTAR FASCIITIS: ICD-10-CM

## 2024-07-01 PROCEDURE — 97140 MANUAL THERAPY 1/> REGIONS: CPT | Mod: GP,CQ

## 2024-07-01 PROCEDURE — 97110 THERAPEUTIC EXERCISES: CPT | Mod: GP,CQ

## 2024-07-01 PROCEDURE — 97035 APP MDLTY 1+ULTRASOUND EA 15: CPT | Mod: GP,CQ

## 2024-07-01 ASSESSMENT — PAIN - FUNCTIONAL ASSESSMENT: PAIN_FUNCTIONAL_ASSESSMENT: 0-10

## 2024-07-01 NOTE — PROGRESS NOTES
Physical Therapy Treatment    Patient Name: Blaine Méndez  MRN: [id number]  Today's Date: 7/1/2024  Visit #5  Time Calculation  Start Time: 0925  Stop Time: 1003  Time Calculation (min): 38 min     PT Therapeutic Procedures Time Entry  Manual Therapy Time Entry: 10  Therapeutic Exercise Time Entry: 20  PT Modalities Time Entry  Ultrasound Time Entry: 8              Payor: [facility] / Plan: [facility] / Product Type: *No Product type* /   Rodrick Valera  Reason for Referral: L heel, plantar fasciitis  General Comment: Visit 5 of MN      Current Problem:  Problem List Items Addressed This Visit             ICD-10-CM    Left foot pain M79.672    Plantar fasciitis M72.2     Subjective   General:  Pt reports feet are doing well, overall better. Feels improvement since he has been conscious about gait mechanics, not walking on outside of foot.   He has been keeping up on his walking, approx 8 miles each day.  HEP Compliance:  yes    Patient stated goal:  Decrease pain and increase walking distances    Pain:  Pain Assessment: 0-10  Pain Location: Foot  Pain Orientation: Left    Precautions:  Precautions  Precautions Comment: None    Objective   No objective measures taken this visit      Treatment:  Therapeutic exercise  Nustep L4 x 5 min( seat 13)/Upright bike L3 x 5 min   Fitter board gastroc/soleus stretch x1 min ea  HR on EOS 2x10 eccentric control   HR with knee's bent 2x10   L reverse step up /down 6'' 2x10   Arch lift BL 2 sec 2x10   AE tandem x1 min BL       Manual   TPR to calcaneus, calf , plantar fascia  Manual stretching to calf, plantar fascia     Informed consent given on manual treatment. Pt given opportunity to cease treatment at any time. Educated pt on expected soreness, possible ecchymosis. Pt voiced understanding  No adverse effects were noted post tx.      Modalities  US to L plantar fascia 3 MHz, .5 w/cm2 x 10 min, cont    Assessment  Pt overall progressing well with  PT with reduced heel pain and demonstrates improved gait mechanics.   No longer TTP along L proximal aspect of calcaneus but tenderness/tightness along calf persists.  Continues to respond well to manual tx and US.   Plan    Continue to progress POC as tolerated by patient to improve strength, mobility and overall function  Progress Wb'ing strengthening as tolerated     HEP:  Access Code: [email or web address]  URL: [email or web address]  Date: 06/17/2024  Prepared by: Dolores Bridges    Exercises  - Standing Gastroc Stretch  - 2 x daily - 7 x weekly - 2 reps - 30 seconds hold  - Standing Soleus Stretch  - 2 x daily - 7 x weekly - 2 reps - 30 second hold  - Standing Toe Dorsiflexion Stretch  - 2 x daily - 7 x weekly - 2 reps - 30 seconds hold  - Foot Roller Plantar Massage  - 1 x daily - 7 x weekly - 2-3 minute time   - Eccentric Plantar Fascia Strengthening on Step  - 1 x daily - 7 x weekly - 2 sets - 10 reps  - Standing Bilateral Heel Raise on Step  - 1 x daily - 7 x weekly - 2 sets - 10 reps  - Arch Lifting  - 1 x daily - 7 x weekly - 2 sets - 10 reps - 2 sec hold    Patient Education  - Plantar Fasciitis      Goals:  Active       PT Problem       Reduce pain at worst to 2/10 with all functional and recreational activity.        Start:  06/06/24    Expected End:  09/04/24            Increase ROM/flexibility to WFL to perform daily functional activities including amb regardless of terrain, stair negotiation        Start:  06/06/24    Expected End:  09/04/24            Increase by > or = 1/2 mm grade to improve amb on even and uneven terrain, stair negotiation, transfers to perform daily tasks without increased pain/compensation         Start:  06/06/24    Expected End:  09/04/24            Pt to score an increase of 10 points or > on LEFS to display overall increased function.         Start:  06/06/24    Expected End:  09/04/24            Patient will demonstrate independence in home program for support  of progression       Start:  06/06/24    Expected End:  09/04/24

## 2024-07-10 NOTE — PROGRESS NOTES
Physical Therapy Treatment    Patient Name: Blaine Méndez  MRN: [id number]  Today's Date: 7/11/2024  Visit #6  Time Calculation  Start Time: 0917  Stop Time: 0957  Time Calculation (min): 40 min     PT Therapeutic Procedures Time Entry  Manual Therapy Time Entry: 5  Therapeutic Exercise Time Entry: 25  PT Modalities Time Entry  Ultrasound Time Entry: 10              Payor: [facility] / Plan: [facility] / Product Type: *No Product type* /   Rodrick Valera  Reason for Referral: L heel, plantar fasciitis  General Comment: Visit 6 of MN      Current Problem:  Problem List Items Addressed This Visit             ICD-10-CM    Left foot pain M79.672    Plantar fasciitis M72.2       Subjective   General:  Pt reports symptoms are much improved. States he got out of bed this morning without pain.     HEP Compliance:  yes    Patient stated goal:  Decrease pain and increase walking distances    Pain:  0-10 (Numeric) Pain Score: 0 - No pain    Precautions:  Precautions  Precautions Comment: None    Objective   Lower Extremity ROM: (WNL unless documented below) (p=pain)   ROM in Degrees  LEFT   Ankle DF 10    Ankle PF 62   Ankle INV 45   Ankle EV 10      Lower Extremity STRENGTH: (WNL unless documented below) (p=pain)   MMT 5/5 max  LEFT   Ankle DF 5   Ankle PF 5   Ankle INV 5   Ankle EV 5      Heel raises on L more challenging to perform compared to R.      Balance: 5 sec bilaterally with increased ankle righting on the L   Posture: mild pes planus   Palpation: Non TTP currently however pt noted it is usually tender in proximal aspect of calcaneus    Other Measures  Lower Extremity Funtional Score (LEFS): 79/80  Other Measures  Lower Extremity Funtional Score (LEFS): 79/80    Treatment:  Therapeutic exercise  Nustep L4 x 5 min( seat 13)/Upright bike L3 x 5 min   EOS gastroc/soleus stretch x1 min ea  HR on EOS 2x10 eccentric control     Reheck performed     AE tandem x1 min BL       Manual   TPR to  calcaneus, calf , plantar fascia  Manual stretching to calf, plantar fascia     Informed consent given on manual treatment. Pt given opportunity to cease treatment at any time. Educated pt on expected soreness, possible ecchymosis. Pt voiced understanding  No adverse effects were noted post tx.      Modalities  US to L plantar fascia 3 MHz, .5 w/cm2 x 10 min, cont    Assessment  Pt demonstrates improved ROM and strength. At this point he has met his functional goals and is appropriate for discharge with continued compliance with updated HEP.     Plan  Discharge to HEP     Access Code: [email or web address]  URL: [email or web address]  Date: 07/11/2024  Prepared by: Dolores Malone    Exercises  - Standing Gastroc Stretch  - 2 x daily - 7 x weekly - 2 reps - 30 seconds hold  - Standing Soleus Stretch  - 2 x daily - 7 x weekly - 2 reps - 30 second hold  - Standing Gastroc Stretch on Step with Counter Support  - 1 x daily - 7 x weekly - 2 reps - 30 seconds hold  - Standing Soleus Stretch on Step with Counter Support  - 1 x daily - 7 x weekly - 2 reps - 30 seconds hold  - Standing Toe Dorsiflexion Stretch  - 2 x daily - 7 x weekly - 2 reps - 30 seconds hold  - Foot Roller Plantar Massage  - 1 x daily - 7 x weekly - 2-3 minute time   - Eccentric Plantar Fascia Strengthening on Step  - 1 x daily - 3-4 x weekly - 2 sets - 10 reps  - Standing Bilateral Heel Raise on Step  - 1 x daily - 3-4 x weekly - 2 sets - 10 reps  - Eccentric Heel Lowering on Step  - 1 x daily - 3-4 x weekly - 3 sets - 10 reps  - Arch Lifting  - 1 x daily - 3-4 x weekly - 2 sets - 10 reps - 2 sec hold  - Tandem Stance  - 1 x daily - 3-4 x weekly - 2 reps - 30 seconds hold  - Single Leg Stance  - 1 x daily - 3-4 x weekly - 3 sets - 10 reps    Patient Education  - Plantar Fasciitis  Goals:  Resolved       PT Problem       Reduce pain at worst to 2/10 with all functional and recreational activity.  (Met)       Start:  06/06/24    Expected End:   09/04/24    Resolved:  07/11/24         Increase ROM/flexibility to WFL to perform daily functional activities including amb regardless of terrain, stair negotiation  (Met)       Start:  06/06/24    Expected End:  09/04/24    Resolved:  07/11/24         Increase by > or = 1/2 mm grade to improve amb on even and uneven terrain, stair negotiation, transfers to perform daily tasks without increased pain/compensation   (Met)       Start:  06/06/24    Expected End:  09/04/24    Resolved:  07/11/24         Pt to score an increase of 10 points or > on LEFS to display overall increased function.   (Met)       Start:  06/06/24    Expected End:  09/04/24    Resolved:  07/11/24         Patient will demonstrate independence in home program for support of progression (Met)       Start:  06/06/24    Expected End:  09/04/24    Resolved:  07/11/24      Goal Note       Patient will demonstrate independence in home program for support of progression

## 2024-07-11 ENCOUNTER — TREATMENT (OUTPATIENT)
Dept: PHYSICAL THERAPY | Facility: CLINIC | Age: 64
End: 2024-07-11
Payer: COMMERCIAL

## 2024-07-11 DIAGNOSIS — M72.2 PLANTAR FASCIITIS: ICD-10-CM

## 2024-07-11 DIAGNOSIS — M79.672 LEFT FOOT PAIN: ICD-10-CM

## 2024-07-11 PROCEDURE — 97035 APP MDLTY 1+ULTRASOUND EA 15: CPT | Mod: GP | Performed by: PHYSICAL THERAPIST

## 2024-07-11 PROCEDURE — 97110 THERAPEUTIC EXERCISES: CPT | Mod: GP | Performed by: PHYSICAL THERAPIST

## 2024-07-11 ASSESSMENT — PAIN SCALES - GENERAL: PAINLEVEL_OUTOF10: 0 - NO PAIN

## 2024-09-07 DIAGNOSIS — I10 ESSENTIAL (PRIMARY) HYPERTENSION: ICD-10-CM

## 2024-09-08 RX ORDER — HYDROCHLOROTHIAZIDE 25 MG/1
25 TABLET ORAL DAILY
Qty: 90 TABLET | Refills: 1 | Status: SHIPPED | OUTPATIENT
Start: 2024-09-08

## 2024-09-11 ENCOUNTER — LAB (OUTPATIENT)
Dept: LAB | Facility: LAB | Age: 64
End: 2024-09-11
Payer: COMMERCIAL

## 2024-09-11 DIAGNOSIS — Z12.5 PROSTATE CANCER SCREENING: ICD-10-CM

## 2024-09-11 LAB — PSA SERPL-MCNC: 7.38 NG/ML

## 2024-09-11 PROCEDURE — 36415 COLL VENOUS BLD VENIPUNCTURE: CPT

## 2024-09-11 PROCEDURE — 84153 ASSAY OF PSA TOTAL: CPT

## 2024-09-23 ENCOUNTER — APPOINTMENT (OUTPATIENT)
Dept: UROLOGY | Facility: HOSPITAL | Age: 64
End: 2024-09-23
Payer: COMMERCIAL

## 2024-09-23 DIAGNOSIS — N40.0 BENIGN PROSTATIC HYPERPLASIA, UNSPECIFIED WHETHER LOWER URINARY TRACT SYMPTOMS PRESENT: ICD-10-CM

## 2024-09-23 DIAGNOSIS — N52.9 ERECTILE DYSFUNCTION, UNSPECIFIED ERECTILE DYSFUNCTION TYPE: ICD-10-CM

## 2024-09-23 DIAGNOSIS — R97.20 ELEVATED PSA: Primary | ICD-10-CM

## 2024-09-23 LAB
POC APPEARANCE, URINE: CLEAR
POC BILIRUBIN, URINE: NEGATIVE
POC BLOOD, URINE: NEGATIVE
POC COLOR, URINE: YELLOW
POC GLUCOSE, URINE: NEGATIVE MG/DL
POC KETONES, URINE: NEGATIVE MG/DL
POC LEUKOCYTES, URINE: NEGATIVE
POC NITRITE,URINE: NEGATIVE
POC PH, URINE: 6 PH
POC PROTEIN, URINE: NEGATIVE MG/DL
POC SPECIFIC GRAVITY, URINE: 1.02
POC UROBILINOGEN, URINE: 0.2 EU/DL

## 2024-09-23 PROCEDURE — 51798 US URINE CAPACITY MEASURE: CPT | Performed by: NURSE PRACTITIONER

## 2024-09-23 PROCEDURE — 99214 OFFICE O/P EST MOD 30 MIN: CPT | Performed by: NURSE PRACTITIONER

## 2024-09-23 PROCEDURE — G2211 COMPLEX E/M VISIT ADD ON: HCPCS | Performed by: NURSE PRACTITIONER

## 2024-09-23 PROCEDURE — 81003 URINALYSIS AUTO W/O SCOPE: CPT | Performed by: NURSE PRACTITIONER

## 2024-09-23 PROCEDURE — 1036F TOBACCO NON-USER: CPT | Performed by: NURSE PRACTITIONER

## 2024-09-23 NOTE — PROGRESS NOTES
[facility]  Outpatient Clinic Note    Subjective   Trey Méndez is a 64 y.o. male    History of Present Illness   Patient presenting to clinic today for 6 month FUV. Last visit with Dr. Boogie 3/25/2024  History of elevated PSA. Intermittent scrotal pain on left side, now resolved   Family History of Prostate Cancer- Uncle     MR Prostate with larry boundaries 3/20/24: BPH changes of the transition zone. Diffuse non nodular  hypointensities within the peripheral zone, without evidence of  focally restricted diffusion ( PI-RADS 2).     Scrotal Pain/ hip-leg pain  US benign, will continue to monitor for now     US scrotum w dopplers:   IMPRESSION:  Unremarkable scrotal/testicular ultrasound     Urinalysis today Negative   PVR 10 ml     Lab Results   Component Value Date    PSA 7.38 (H) 09/11/2024    PSA 5.24 (H) 03/06/2024    PSA 5.90 (H) 02/20/2024    PSA 4.02 (H) 05/25/2021     Past Medical History and Surgical History   Past Medical History:   Diagnosis Date    Left lower quadrant pain 07/13/2019    Left groin pain    Personal history of other diseases of the circulatory system 01/03/2020    History of hypertension    Personal history of other diseases of the digestive system 04/08/2019    History of diverticulitis of colon    Personal history of other diseases of the musculoskeletal system and connective tissue 04/08/2019    History of backache    Personal history of other diseases of the respiratory system 03/21/2018    History of acute bronchitis    Personal history of other endocrine, nutritional and metabolic disease 01/03/2020    History of morbid obesity    Personal history of other specified conditions     History of abdominal pain     Past Surgical History:   Procedure Laterality Date    OTHER SURGICAL HISTORY  01/03/2020    Colonoscopy       Medications  Current Outpatient Medications on File Prior to Visit   Medication Sig Dispense Refill    hydroCHLOROthiazide (HYDRODiuril) 25 mg tablet  Take 1 tablet (25 mg) by mouth once daily. 90 tablet 1    loratadine (Claritin) 10 mg tablet TAKE 1 TABLET BY MOUTH EVERY DAY 90 tablet 1    pantoprazole (ProtoNix) 40 mg EC tablet Take 1 tablet (40 mg) by mouth once daily.      psyllium (Metamucil) 3.4 gram packet Take 1 Dose by mouth in the morning and 1 Dose before bedtime.      sildenafil (Viagra) 50 mg tablet Take 1 tablet (50 mg) by mouth. TAKE 1 TABLET DAILY 1 HOUR BEFORE NEEDED      valsartan (Diovan) 320 mg tablet TAKE 1 TABLET (320 MG) BY MOUTH ONCE DAILY. 90 tablet 2     No current facility-administered medications on file prior to visit.       Objective   Physicial Exam  General: Well developed, well nourished, alert and cooperative, appears in no acute distress  Eyes: Non-injected conjunctiva, sclera clear, no proptosis  Cardiac: Extremities are warm and well perfused. No edema, cyanosis or pallor.   Lungs: Breathing is easy, non-labored. Speaking in clear and complete sentences. Normal diaphragmatic movement.  MSK: Ambulatory with steady gait, unassisted  Neuro: alert and oriented to person, place and time  Psych: Demonstrates good judgement and reason, without hallucinations, abnormal affect or abnormal behaviors.  Skin: no obvious lesions, no rashes.    No visits with results within 1 Day(s) from this visit.   Latest known visit with results is:   Lab on 09/11/2024   Component Date Value Ref Range Status    Prostate Specific AG 09/11/2024 7.38 (H)  <=4.00 ng/mL Final      Review of Systems  All other systems have been reviewed and are negative for complaint.      Assessment and Plan   - Elevated PSA: Discussed the different etiologies of elevated PSA, as well as the risks and benefits of screening. We discussed prostate cancer and both MRI and prostate biopsy. I counseled the patient regarding risk of infection, bleeding, etc from prostate biopsy.      Reviewed MRI results today, Pi-RADS 2   PSA in 6 months     - Scrotal pain resolved   -US  benign  -continue to monitor    - ED  Continue Sildenafil 50 to 100 mg prn. Denies Nitroglycerin use. Does not need refill today.     All questions and concerns were addressed. Patient verbalizes understanding and has no other questions at this time.     Abbie Gupta-- DHARMESH LORA  Office Phone:  [phone]

## 2024-10-01 ENCOUNTER — APPOINTMENT (OUTPATIENT)
Dept: DERMATOLOGY | Facility: CLINIC | Age: 64
End: 2024-10-01
Payer: COMMERCIAL

## 2024-10-01 DIAGNOSIS — Z12.83 SCREENING EXAM FOR SKIN CANCER: ICD-10-CM

## 2024-10-01 DIAGNOSIS — L81.4 LENTIGO: ICD-10-CM

## 2024-10-01 DIAGNOSIS — L57.0 ACTINIC KERATOSIS: ICD-10-CM

## 2024-10-01 DIAGNOSIS — L82.1 SEBORRHEIC KERATOSIS: ICD-10-CM

## 2024-10-01 DIAGNOSIS — D22.9 NEVUS: Primary | ICD-10-CM

## 2024-10-01 PROCEDURE — 99213 OFFICE O/P EST LOW 20 MIN: CPT | Performed by: NURSE PRACTITIONER

## 2024-10-01 NOTE — PROGRESS NOTES
Subjective     Trey Méndez is a 64 y.o. male who presents for the following: Skin Check.     Review of Systems:  No other skin or systemic complaints other than what is documented elsewhere in the note.    The following portions of the chart were reviewed this encounter and updated as appropriate:       Skin Cancer History  No skin cancer on file.    Specialty Problems    None    Past Medical History:  Trey Méndez  has a past medical history of Left lower quadrant pain (07/13/2019), Personal history of other diseases of the circulatory system (01/03/2020), Personal history of other diseases of the digestive system (04/08/2019), Personal history of other diseases of the musculoskeletal system and connective tissue (04/08/2019), Personal history of other diseases of the respiratory system (03/21/2018), Personal history of other endocrine, nutritional and metabolic disease (01/03/2020), and Personal history of other specified conditions.    Past Surgical History:  Trey Méndez  has a past surgical history that includes Other surgical history (01/03/2020).    Family History:  Patient family history includes Hyperlipidemia in his father; Hypertension in his father; Lung cancer in his paternal grandfather and paternal grandmother; cardiac disorder in his father.    Social History:  Trey Méndez  reports that he has never smoked. He has never been exposed to tobacco smoke. He has never used smokeless tobacco. Alcohol use questions deferred to the physician. He reports that he does not use drugs.    Allergies:  Patient has no known allergies.    Current Medications / CAM's:    Current Outpatient Medications:     hydroCHLOROthiazide (HYDRODiuril) 25 mg tablet, Take 1 tablet (25 mg) by mouth once daily., Disp: 90 tablet, Rfl: 1    pantoprazole (ProtoNix) 40 mg EC tablet, Take 1 tablet (40 mg) by mouth once daily., Disp: , Rfl:     psyllium (Metamucil) 3.4 gram packet, Take 1 Dose by mouth in the morning and 1 Dose  before bedtime., Disp: , Rfl:     sildenafil (Viagra) 50 mg tablet, Take 1 tablet (50 mg) by mouth. TAKE 1 TABLET DAILY 1 HOUR BEFORE NEEDED, Disp: , Rfl:     valsartan (Diovan) 320 mg tablet, TAKE 1 TABLET (320 MG) BY MOUTH ONCE DAILY., Disp: 90 tablet, Rfl: 2     Objective   Well appearing patient in no apparent distress; mood and affect are within normal limits.    A focused skin examination was performed. All findings within normal limits unless otherwise noted below.    Assessment/Plan   1. Nevus  Uniform pigmented macule(s)/papule(s) with reassuring findings on dermoscopy    -Discussed nature of condition  -Reassurance, benign-appearing features on examination today  -Recommend continued observation    2. Lentigo  Tan macules    -Benign appearing on exam  -Reassurance, recommend observation    3. Seborrheic keratosis  Stuck on, waxy macule(s)/papule(s)/plaque(s) with comedo-like openings and milia like cysts    -Discussed nature of condition  -Reassurance, recommend continued observation    4. Screening exam for skin cancer    5. Actinic keratosis (2)  Neck - Anterior (2)  Erythematous scaly macule(s)    -Discussed nature of diagnosis and treatment options.   -Patient wishes to proceed with Cryotherapy today  -Possible side effects of liquid nitrogen treatment reviewed including formation of blisters, crusting, tenderness, scar, and discoloration which may be permanent.  -Patient advised to return the office for re-evaluation if the treated lesion(s) do not resolve within 4-6 weeks. Patient verbalizes understanding.    Destr of lesion - Neck - Anterior (2)  Complexity: simple    Destruction method: cryotherapy    Informed consent: discussed and consent obtained    Lesion destroyed using liquid nitrogen: Yes    Region frozen until ice ball extended beyond lesion: Yes    Cryotherapy cycles:  1  Outcome: patient tolerated procedure well with no complications    Post-procedure details: wound care instructions  given

## 2024-10-19 ENCOUNTER — OFFICE VISIT (OUTPATIENT)
Dept: URGENT CARE | Age: 64
End: 2024-10-19
Payer: COMMERCIAL

## 2024-10-19 VITALS
WEIGHT: 290 LBS | HEART RATE: 73 BPM | DIASTOLIC BLOOD PRESSURE: 82 MMHG | TEMPERATURE: 98.1 F | RESPIRATION RATE: 18 BRPM | OXYGEN SATURATION: 96 % | HEIGHT: 72 IN | SYSTOLIC BLOOD PRESSURE: 126 MMHG | BODY MASS INDEX: 39.28 KG/M2

## 2024-10-19 DIAGNOSIS — U07.1 COVID-19: Primary | ICD-10-CM

## 2024-10-19 DIAGNOSIS — J45.21 MILD INTERMITTENT ASTHMA WITH ACUTE EXACERBATION (HHS-HCC): ICD-10-CM

## 2024-10-19 RX ORDER — BENZONATATE 100 MG/1
100 CAPSULE ORAL 3 TIMES DAILY PRN
Qty: 42 CAPSULE | Refills: 0 | Status: SHIPPED | OUTPATIENT
Start: 2024-10-19 | End: 2024-11-18

## 2024-10-19 RX ORDER — ALBUTEROL SULFATE 90 UG/1
2 INHALANT RESPIRATORY (INHALATION) EVERY 6 HOURS PRN
Qty: 18 G | Refills: 0 | Status: SHIPPED | OUTPATIENT
Start: 2024-10-19 | End: 2025-10-19

## 2024-10-19 RX ORDER — PREDNISONE 20 MG/1
TABLET ORAL
Qty: 18 TABLET | Refills: 0 | Status: SHIPPED | OUTPATIENT
Start: 2024-10-19 | End: 2024-10-27

## 2024-10-19 NOTE — PATIENT INSTRUCTIONS
Notify your PCP of your diagnosis and treatment.    Follow up here or at the ER if any new/worsening symptoms.

## 2024-10-19 NOTE — PROGRESS NOTES
"Subjective   Patient ID: Blaine Méndez \"Nunu" is a 64 y.o. male. They present today with a chief complaint of Cough, Fever, and Nasal Congestion.    Patient disposition: Home    HISTORY OF PRESENT ILLNESS:    This is an adult male with asthma fully vaccinated against COVID-19 presenting for COVID-19 infxn. Positive home test today. Admits 2d of cough, subjective fever, shortness of breath with exertion. Denies CP, GI sx, dyspnea at rest, production of cough.    Past Medical History  Allergies as of 10/19/2024    (No Known Allergies)       (Not in a hospital admission)       Past Medical History:   Diagnosis Date    Left lower quadrant pain 07/13/2019    Left groin pain    Personal history of other diseases of the circulatory system 01/03/2020    History of hypertension    Personal history of other diseases of the digestive system 04/08/2019    History of diverticulitis of colon    Personal history of other diseases of the musculoskeletal system and connective tissue 04/08/2019    History of backache    Personal history of other diseases of the respiratory system 03/21/2018    History of acute bronchitis    Personal history of other endocrine, nutritional and metabolic disease 01/03/2020    History of morbid obesity    Personal history of other specified conditions     History of abdominal pain       Past Surgical History:   Procedure Laterality Date    OTHER SURGICAL HISTORY  01/03/2020    Colonoscopy        reports that he has never smoked. He has never been exposed to tobacco smoke. He has never used smokeless tobacco. Alcohol use questions deferred to the physician. He reports that he does not use drugs.    Review of Systems    Negative except as documented in the History of Present Illness.                             Objective    Vitals:    10/19/24 1536   BP: 126/82   Pulse: 73   Resp: 18   Temp: 36.7 °C (98.1 °F)   SpO2: 96%   Weight: 132 kg (290 lb)   Height: 1.829 m (6')     No LMP for male " patient.      PHYSICAL EXAMINATION:    CONSTITUTIONAL: well-appearing, nontoxic         ENT:  Head and face are unremarkable and atraumatic. Mucous membranes moist.    * Oropharynx nl. Airway patent.    * No uvular deviation. No visible abscess.    * Lymphadenopathy absent.    * TMs nl bl.         LUNGS:  Mild wheezing throughout, no r/r. No increased WOB.    CARDIOVASCULAR:   RRR, no m/r/g. Nl S1/S2.    ABDOMEN:  Nontender including left upper quadrant, nondistended, no acute abdomen.     MUSCULOSKELETAL: No obvious deformities. MAYA with equal strength. Gait normal.    SKIN:   Warm and dry with no rashes.    NEURO:  Normal baseline mental status.    PSYCH: Appropriate mood and affect.         ------------------------------------------         MDM: Asthma exacerbation caused by COVID-19 to be treated with albuterol, prednisone, Tessalon PRN. Discussed talking to his PCP about Paxlovid and he will contact them immediately. Will fu at ED PRN if worsening. VSWNL today.        Procedures    Diagnostic study results (if any) were reviewed by Luiz Contreras PA-C.    No results found for this visit on 10/19/24.     Assessment/Plan   Allergies, medications, history, and pertinent labs/EKGs/Imaging reviewed by Luiz Contreras PA-C.     Orders and Diagnoses  There are no diagnoses linked to this encounter.    Medical Admin Record      Follow Up Instructions  No follow-ups on file.    Electronically signed by Luiz Contreras PA-C  3:49 PM

## 2024-12-18 ENCOUNTER — APPOINTMENT (OUTPATIENT)
Dept: PRIMARY CARE | Facility: CLINIC | Age: 64
End: 2024-12-18
Payer: COMMERCIAL

## 2024-12-18 VITALS
WEIGHT: 301 LBS | HEIGHT: 72 IN | SYSTOLIC BLOOD PRESSURE: 125 MMHG | OXYGEN SATURATION: 94 % | BODY MASS INDEX: 40.77 KG/M2 | DIASTOLIC BLOOD PRESSURE: 85 MMHG | HEART RATE: 79 BPM

## 2024-12-18 DIAGNOSIS — Z00.00 HEALTH CARE MAINTENANCE: Primary | ICD-10-CM

## 2024-12-18 DIAGNOSIS — R73.9 HYPERGLYCEMIA: ICD-10-CM

## 2024-12-18 DIAGNOSIS — Z11.59 NEED FOR HEPATITIS C SCREENING TEST: ICD-10-CM

## 2024-12-18 DIAGNOSIS — N52.9 ERECTILE DYSFUNCTION, UNSPECIFIED ERECTILE DYSFUNCTION TYPE: ICD-10-CM

## 2024-12-18 PROBLEM — G93.31 POST VIRAL SYNDROME: Status: RESOLVED | Noted: 2023-03-13 | Resolved: 2024-12-18

## 2024-12-18 PROBLEM — R05.9 COUGH: Status: RESOLVED | Noted: 2023-03-08 | Resolved: 2024-12-18

## 2024-12-18 PROCEDURE — 3074F SYST BP LT 130 MM HG: CPT | Performed by: INTERNAL MEDICINE

## 2024-12-18 PROCEDURE — 3008F BODY MASS INDEX DOCD: CPT | Performed by: INTERNAL MEDICINE

## 2024-12-18 PROCEDURE — 99396 PREV VISIT EST AGE 40-64: CPT | Performed by: INTERNAL MEDICINE

## 2024-12-18 PROCEDURE — 3079F DIAST BP 80-89 MM HG: CPT | Performed by: INTERNAL MEDICINE

## 2024-12-18 PROCEDURE — 1036F TOBACCO NON-USER: CPT | Performed by: INTERNAL MEDICINE

## 2024-12-18 RX ORDER — SILDENAFIL 50 MG/1
50 TABLET, FILM COATED ORAL AS NEEDED
Qty: 10 TABLET | Refills: 2 | Status: SHIPPED | OUTPATIENT
Start: 2024-12-18

## 2024-12-18 NOTE — PROGRESS NOTES
"Subjective   Patient ID: Blaine Méndez \"Nunu" is a 64 y.o. male who presents for Annual Exam.          HPI patient is a 64-year-old male with past medical history of obesity and erectile dysfunction who presents for wellness exam.  Patient up-to-date on calcium score of the heart colonoscopy    Flu vaccine.  He has not had his RSV vaccine.  He retired this past summer.  He has gained greater than 10 pounds since then.  He is more active but eats more now that he has more free time.  He has 2 grandkids aged 1 and 5.  Denies illicit substances or smoking.  Alcohol occasionally.  Overall he is much happier since penitentiary.  No complaints at this time.  Needs refills of his sildenafil    Review of Systems  Constitutional: No fever or chills, No Night Sweats  Eyes: No Blurry Vision or Eye sight problems  ENT: No Nasal Discharge, Hoarseness, sore throat  Cardiovascular: no chest pain, no palpitations and no syncope.   Respiratory: no cough, no shortness of breath during exertion and no shortness of breath at rest.   Gastrointestinal: no abdominal pain, no nausea and no vomiting.   : No issues with urinary stream, burning with urination, no blood in urine or stools  Skin: No Skin rashes or Lesions  Neuro: No Headache, no dizziness or Numbness or tingling  Psych: No Anxiety, depression or sleeping problems  Heme: No Easy bleeding or brusing.     Objective   /85   Pulse 79   Ht 1.829 m (6')   Wt 137 kg (301 lb)   SpO2 94%   BMI 40.82 kg/m²     Physical Exam  Constitutional: Alert and in no acute distress. Well developed, well nourished.   Head and Face: Head and face: Normal.    Eyes: Normal external exam. Pupils were equal in size, round, reactive to light (PERRL) with normal accommodation and extraocular movements intact (EOMI).   Ears, Nose, Mouth, and Throat: External inspection of ears and nose: Normal.  Hearing: Normal.  Nasal mucosa, septum, and turbinates: Normal.  Lips, teeth, and gums: Normal.  " Oropharynx: Normal.   Neck: No neck mass was observed. Supple. Thyroid not enlarged and there were no palpable thyroid nodules.   Cardiovascular: Heart rate and rhythm were normal, normal S1 and S2. Pedal pulses: Normal. No peripheral edema.   Pulmonary: No respiratory distress. Clear bilateral breath sounds.   Abdomen: Soft nontender; no abdominal mass palpated. Normal bowel sounds. No organomegaly.   : Deferred  Musculoskeletal: No joint swelling seen, normal movements of all extremities. Range of motion: Normal.  Muscle strength/tone: Normal.    Skin: Normal skin color and pigmentation, normal skin turgor, and no rash.   Neurologic: Deep tendon reflexes were 2+ and symmetric.   Psychiatric: Judgment and insight: Intact. Mood and affect: Normal.  Lymphatic: No cervical lymphadenopathy. Palpation of lymph nodes in axillae: Normal.  Palpation of lymph nodes in groin: Normal.    Lab Results   Component Value Date    WBC 7.1 10/13/2023    HGB 15.4 10/13/2023    HCT 49.0 10/13/2023     10/13/2023    CHOL 187 10/13/2023    TRIG 90 10/13/2023    HDL 45.5 10/13/2023    ALT 19 10/13/2023    AST 18 10/13/2023     10/13/2023    K 4.1 10/13/2023     10/13/2023    CREATININE 0.85 10/13/2023    BUN 15 10/13/2023    CO2 24 10/13/2023    TSH 5.09 (H) 10/13/2023    PSA 7.38 (H) 09/11/2024    INR 1.1 05/02/2019    HGBA1C 5.9 (A) 09/12/2022       XR foot left 3+ views  Narrative: Interpreted By:  Eleuterio Li,   STUDY:  XR FOOT LEFT 3+ VIEWS  4/9/2024 8:18 am      INDICATION:  Signs/Symptoms:heel pain      COMPARISON:  None.      ACCESSION NUMBER(S):  [id number]      ORDERING CLINICIAN:  EMILIA CONDE      TECHNIQUE:  Three views of the left foot including AP , oblique and lateral  projections were obtained.      FINDINGS:  There is no radiographic evidence of acute fracture or dislocation  identified. The joint spaces are well preserved throughout without  significant degenerative changes.      Impression:  1. No acute fracture or dislocation identified.      MACRO:  None.      Signed by: Eleuterio Li 4/10/2024 11:29 AM  Dictation workstation:   [id number]      Assessment/Plan   Problem List Items Addressed This Visit    None  Visit Diagnoses         Codes    Health care maintenance    -  Primary Z00.00    Relevant Orders    CBC    Comprehensive metabolic panel    Lipid Panel    TSH with reflex to Free T4 if abnormal    Albumin-Creatinine Ratio, Urine Random    Hemoglobin A1C    Erectile dysfunction, unspecified erectile dysfunction type     N52.9    Relevant Medications    sildenafil (Viagra) 50 mg tablet    Hyperglycemia     R73.9    Relevant Orders    Hemoglobin A1C    Need for hepatitis C screening test     Z11.59    Relevant Orders    Hepatitis C antibody              Dear Blaine Méndez     It was my pleasure to take care of you today in the office. Below are the things we discussed today:    1. Immunizations: Yearly Flu shot is recommended.   Up-to-date on flu and tetanus vaccine.  Consider RSV vaccine at the local pharmacy.    2. Blood Work: Ordered  3. Seen your dentist twice a year  4. Yearly Eye exam is recommended    5. BMI: 40.8  6: Diet recommendations:   Eat Clean, Try to have as many home cooked meals as possible  Avoid processed foods which contain excess calories, sugar, and sodium.    7. Exercise recommendations:   150 minutes a week to maintain your weight     If you have to loose weight, you need a better diet and exercise plan.     8. Please get your Living will / Advance directive completed if you do not have one already. Please make sure our office has a copy of the latest one.     9. Colonoscopy: Uptodate  10. PSA: Up-to-date now in surveillance with urology due to rising PSA    11.  Follow-up annually or as needed    Follow up in one year for a Physical. Please call the office before your Physical to see if you need blood work completed prior to your physical.     Please call me if any  questions arise from now until your next visit. I will call you after I am done seeing patients. A Doctor is always available by phone when the office is closed. Please feel free to call for help with any problem that you feel shouldn't wait until the office re-opens.     Jt Cardenas, DO

## 2024-12-19 ENCOUNTER — OFFICE (OUTPATIENT)
Dept: URBAN - METROPOLITAN AREA CLINIC 26 | Facility: CLINIC | Age: 64
End: 2024-12-19
Payer: COMMERCIAL

## 2024-12-19 VITALS
HEART RATE: 79 BPM | SYSTOLIC BLOOD PRESSURE: 122 MMHG | TEMPERATURE: 98.2 F | DIASTOLIC BLOOD PRESSURE: 82 MMHG | WEIGHT: 305 LBS | HEIGHT: 72 IN

## 2024-12-19 DIAGNOSIS — E66.01 MORBID (SEVERE) OBESITY DUE TO EXCESS CALORIES: ICD-10-CM

## 2024-12-19 DIAGNOSIS — Z87.19 PERSONAL HISTORY OF OTHER DISEASES OF THE DIGESTIVE SYSTEM: ICD-10-CM

## 2024-12-19 DIAGNOSIS — K29.60 OTHER GASTRITIS WITHOUT BLEEDING: ICD-10-CM

## 2024-12-19 PROCEDURE — 99213 OFFICE O/P EST LOW 20 MIN: CPT | Performed by: INTERNAL MEDICINE

## 2024-12-23 ENCOUNTER — LAB (OUTPATIENT)
Dept: LAB | Facility: LAB | Age: 64
End: 2024-12-23
Payer: COMMERCIAL

## 2024-12-23 DIAGNOSIS — R73.9 HYPERGLYCEMIA: ICD-10-CM

## 2024-12-23 DIAGNOSIS — Z11.59 NEED FOR HEPATITIS C SCREENING TEST: ICD-10-CM

## 2024-12-23 DIAGNOSIS — Z00.00 HEALTH CARE MAINTENANCE: ICD-10-CM

## 2024-12-23 LAB
ALBUMIN SERPL BCP-MCNC: 4.3 G/DL (ref 3.4–5)
ALP SERPL-CCNC: 90 U/L (ref 33–136)
ALT SERPL W P-5'-P-CCNC: 19 U/L (ref 10–52)
ANION GAP SERPL CALC-SCNC: 13 MMOL/L (ref 10–20)
AST SERPL W P-5'-P-CCNC: 20 U/L (ref 9–39)
BILIRUB SERPL-MCNC: 0.6 MG/DL (ref 0–1.2)
BUN SERPL-MCNC: 15 MG/DL (ref 6–23)
CALCIUM SERPL-MCNC: 9.3 MG/DL (ref 8.6–10.6)
CHLORIDE SERPL-SCNC: 100 MMOL/L (ref 98–107)
CHOLEST SERPL-MCNC: 196 MG/DL (ref 0–199)
CHOLESTEROL/HDL RATIO: 4.2
CO2 SERPL-SCNC: 29 MMOL/L (ref 21–32)
CREAT SERPL-MCNC: 0.86 MG/DL (ref 0.5–1.3)
CREAT UR-MCNC: 287.6 MG/DL (ref 20–370)
EGFRCR SERPLBLD CKD-EPI 2021: >90 ML/MIN/1.73M*2
ERYTHROCYTE [DISTWIDTH] IN BLOOD BY AUTOMATED COUNT: 13.3 % (ref 11.5–14.5)
EST. AVERAGE GLUCOSE BLD GHB EST-MCNC: 123 MG/DL
GLUCOSE SERPL-MCNC: 107 MG/DL (ref 74–99)
HBA1C MFR BLD: 5.9 %
HCT VFR BLD AUTO: 48.2 % (ref 41–52)
HCV AB SER QL: NONREACTIVE
HDLC SERPL-MCNC: 46.9 MG/DL
HGB BLD-MCNC: 15.7 G/DL (ref 13.5–17.5)
LDLC SERPL CALC-MCNC: 131 MG/DL
MCH RBC QN AUTO: 29.7 PG (ref 26–34)
MCHC RBC AUTO-ENTMCNC: 32.6 G/DL (ref 32–36)
MCV RBC AUTO: 91 FL (ref 80–100)
MICROALBUMIN UR-MCNC: <7 MG/L
MICROALBUMIN/CREAT UR: NORMAL MG/G{CREAT}
NON HDL CHOLESTEROL: 149 MG/DL (ref 0–149)
NRBC BLD-RTO: 0 /100 WBCS (ref 0–0)
PLATELET # BLD AUTO: 280 X10*3/UL (ref 150–450)
POTASSIUM SERPL-SCNC: 4.3 MMOL/L (ref 3.5–5.3)
PROT SERPL-MCNC: 7.6 G/DL (ref 6.4–8.2)
RBC # BLD AUTO: 5.29 X10*6/UL (ref 4.5–5.9)
SODIUM SERPL-SCNC: 138 MMOL/L (ref 136–145)
T4 FREE SERPL-MCNC: 1.11 NG/DL (ref 0.78–1.48)
TRIGL SERPL-MCNC: 90 MG/DL (ref 0–149)
TSH SERPL-ACNC: 8.32 MIU/L (ref 0.44–3.98)
VLDL: 18 MG/DL (ref 0–40)
WBC # BLD AUTO: 8.5 X10*3/UL (ref 4.4–11.3)

## 2024-12-23 PROCEDURE — 83036 HEMOGLOBIN GLYCOSYLATED A1C: CPT

## 2024-12-23 PROCEDURE — 85027 COMPLETE CBC AUTOMATED: CPT

## 2024-12-23 PROCEDURE — 36415 COLL VENOUS BLD VENIPUNCTURE: CPT

## 2024-12-23 PROCEDURE — 80061 LIPID PANEL: CPT

## 2024-12-23 PROCEDURE — 82043 UR ALBUMIN QUANTITATIVE: CPT

## 2024-12-23 PROCEDURE — 86803 HEPATITIS C AB TEST: CPT

## 2024-12-23 PROCEDURE — 80053 COMPREHEN METABOLIC PANEL: CPT

## 2024-12-23 PROCEDURE — 84439 ASSAY OF FREE THYROXINE: CPT

## 2024-12-23 PROCEDURE — 82570 ASSAY OF URINE CREATININE: CPT

## 2024-12-23 PROCEDURE — 84443 ASSAY THYROID STIM HORMONE: CPT

## 2025-01-06 DIAGNOSIS — E03.9 HYPOTHYROIDISM, UNSPECIFIED TYPE: Primary | ICD-10-CM

## 2025-01-06 RX ORDER — LEVOTHYROXINE SODIUM 25 UG/1
25 TABLET ORAL DAILY
Qty: 90 TABLET | Refills: 3 | Status: SHIPPED | OUTPATIENT
Start: 2025-01-06 | End: 2026-01-06

## 2025-02-19 DIAGNOSIS — I10 ESSENTIAL (PRIMARY) HYPERTENSION: ICD-10-CM

## 2025-02-21 RX ORDER — VALSARTAN 320 MG/1
320 TABLET ORAL DAILY
Qty: 90 TABLET | Refills: 2 | Status: SHIPPED | OUTPATIENT
Start: 2025-02-21

## 2025-02-21 RX ORDER — HYDROCHLOROTHIAZIDE 25 MG/1
25 TABLET ORAL DAILY
Qty: 90 TABLET | Refills: 2 | Status: SHIPPED | OUTPATIENT
Start: 2025-02-21

## 2025-03-12 ENCOUNTER — OFFICE VISIT (OUTPATIENT)
Dept: PRIMARY CARE | Facility: CLINIC | Age: 65
End: 2025-03-12
Payer: COMMERCIAL

## 2025-03-12 VITALS
WEIGHT: 295 LBS | HEART RATE: 82 BPM | SYSTOLIC BLOOD PRESSURE: 122 MMHG | OXYGEN SATURATION: 95 % | BODY MASS INDEX: 40.01 KG/M2 | DIASTOLIC BLOOD PRESSURE: 83 MMHG

## 2025-03-12 DIAGNOSIS — R23.8 OTHER SKIN CHANGES: Primary | ICD-10-CM

## 2025-03-12 PROCEDURE — 3079F DIAST BP 80-89 MM HG: CPT | Performed by: INTERNAL MEDICINE

## 2025-03-12 PROCEDURE — 3074F SYST BP LT 130 MM HG: CPT | Performed by: INTERNAL MEDICINE

## 2025-03-12 PROCEDURE — 99213 OFFICE O/P EST LOW 20 MIN: CPT | Performed by: INTERNAL MEDICINE

## 2025-03-12 PROCEDURE — 1036F TOBACCO NON-USER: CPT | Performed by: INTERNAL MEDICINE

## 2025-03-12 NOTE — PROGRESS NOTES
"Subjective   Patient ID: Blaine Méndez \"Nunu" is a 64 y.o. male who presents for Hand Injury.    HPI     Patient is a 64-year-old male with past medical history of hypertension who presents with chief complaint of a lesion affecting the palm of the left hand.  He states that there was a splinter that he had removed however since removing it there has been some pain and tenderness in the area where the splinter had inserted.  No discharge or redness overlying the skin    Review of Systems  Constitutional: No fever or chills  Cardiovascular: no chest pain, no palpitations and no syncope.   Respiratory: no cough, no shortness of breath during exertion and no shortness of breath at rest.   Gastrointestinal: no abdominal pain, no nausea and no vomiting.  Neuro: No Headache, no dizziness    Objective   /83   Pulse 82   Wt 134 kg (295 lb)   SpO2 95%   BMI 40.01 kg/m²     Physical Exam  Constitutional: Alert and in no acute distress. Well developed, well nourished  Head and Face: Head and face: Normal.    Cardiovascular: Heart rate and rhythm were normal, normal S1 and S2. No peripheral edema.   Pulmonary: No respiratory distress. Clear bilateral breath sounds.  Musculoskeletal: Gait and station: Normal. Muscle strength/tone: Normal.   Skin: Normal skin color and pigmentation, normal skin turgor, and no rash.    Psychiatric: Judgment and insight: Intact. Mood and affect: Normal.    Procedures    Lab Results   Component Value Date    WBC 8.5 12/23/2024    HGB 15.7 12/23/2024    HCT 48.2 12/23/2024     12/23/2024    CHOL 196 12/23/2024    TRIG 90 12/23/2024    HDL 46.9 12/23/2024    ALT 19 12/23/2024    AST 20 12/23/2024     12/23/2024    K 4.3 12/23/2024     12/23/2024    CREATININE 0.86 12/23/2024    BUN 15 12/23/2024    CO2 29 12/23/2024    TSH 8.32 (H) 12/23/2024    PSA 7.38 (H) 09/11/2024    INR 1.1 05/02/2019    HGBA1C 5.9 (H) 12/23/2024       XR foot left 3+ views  Narrative: Interpreted " By:  Eleuterio Li,   STUDY:  XR FOOT LEFT 3+ VIEWS  4/9/2024 8:18 am      INDICATION:  Signs/Symptoms:heel pain      COMPARISON:  None.      ACCESSION NUMBER(S):  [id number]      ORDERING CLINICIAN:  EMILIA CONED      TECHNIQUE:  Three views of the left foot including AP , oblique and lateral  projections were obtained.      FINDINGS:  There is no radiographic evidence of acute fracture or dislocation  identified. The joint spaces are well preserved throughout without  significant degenerative changes.      Impression: 1. No acute fracture or dislocation identified.      MACRO:  None.      Signed by: Eleuterio Li 4/10/2024 11:29 AM  Dictation workstation:   [id number]            Assessment/Plan   Problem List Items Addressed This Visit    None  Visit Diagnoses         Codes    Other skin changes    -  Primary R23.8    Relevant Orders    Referral to Dermatology        Concern for possible retained splinter.  Referral to dermatology for evaluation

## 2025-03-19 LAB — PSA SERPL-MCNC: 6.13 NG/ML

## 2025-03-24 ENCOUNTER — APPOINTMENT (OUTPATIENT)
Dept: UROLOGY | Facility: CLINIC | Age: 65
End: 2025-03-24
Payer: COMMERCIAL

## 2025-03-25 ENCOUNTER — APPOINTMENT (OUTPATIENT)
Dept: UROLOGY | Facility: CLINIC | Age: 65
End: 2025-03-25
Payer: COMMERCIAL

## 2025-03-25 VITALS — BODY MASS INDEX: 41.04 KG/M2 | HEIGHT: 72 IN | WEIGHT: 303 LBS | TEMPERATURE: 97.6 F

## 2025-03-25 DIAGNOSIS — Z09 ENCOUNTER FOR FOLLOW-UP: ICD-10-CM

## 2025-03-25 DIAGNOSIS — N52.9 ERECTILE DYSFUNCTION, UNSPECIFIED ERECTILE DYSFUNCTION TYPE: ICD-10-CM

## 2025-03-25 DIAGNOSIS — R97.20 ELEVATED PSA: Primary | ICD-10-CM

## 2025-03-25 DIAGNOSIS — N40.0 BENIGN PROSTATIC HYPERPLASIA, UNSPECIFIED WHETHER LOWER URINARY TRACT SYMPTOMS PRESENT: ICD-10-CM

## 2025-03-25 DIAGNOSIS — Z12.5 PROSTATE CANCER SCREENING: ICD-10-CM

## 2025-03-25 LAB
POC APPEARANCE, URINE: CLEAR
POC BILIRUBIN, URINE: NEGATIVE
POC BLOOD, URINE: NEGATIVE
POC COLOR, URINE: YELLOW
POC GLUCOSE, URINE: NEGATIVE MG/DL
POC KETONES, URINE: NEGATIVE MG/DL
POC LEUKOCYTES, URINE: NEGATIVE
POC NITRITE,URINE: NEGATIVE
POC PH, URINE: 6.5 PH
POC PROTEIN, URINE: NEGATIVE MG/DL
POC SPECIFIC GRAVITY, URINE: 1.02
POC UROBILINOGEN, URINE: 0.2 EU/DL

## 2025-03-25 PROCEDURE — 1036F TOBACCO NON-USER: CPT | Performed by: NURSE PRACTITIONER

## 2025-03-25 PROCEDURE — G2211 COMPLEX E/M VISIT ADD ON: HCPCS | Performed by: NURSE PRACTITIONER

## 2025-03-25 PROCEDURE — 81003 URINALYSIS AUTO W/O SCOPE: CPT | Performed by: NURSE PRACTITIONER

## 2025-03-25 PROCEDURE — 99213 OFFICE O/P EST LOW 20 MIN: CPT | Performed by: NURSE PRACTITIONER

## 2025-03-25 PROCEDURE — 3008F BODY MASS INDEX DOCD: CPT | Performed by: NURSE PRACTITIONER

## 2025-03-25 PROCEDURE — 51798 US URINE CAPACITY MEASURE: CPT | Performed by: NURSE PRACTITIONER

## 2025-03-25 RX ORDER — SILDENAFIL 100 MG/1
100 TABLET, FILM COATED ORAL AS NEEDED
Qty: 20 TABLET | Refills: 11 | Status: SHIPPED | OUTPATIENT
Start: 2025-03-25 | End: 2025-04-24

## 2025-03-25 ASSESSMENT — PAIN SCALES - GENERAL: PAINLEVEL_OUTOF10: 0-NO PAIN

## 2025-03-25 NOTE — PROGRESS NOTES
[facility]  Outpatient Clinic Note    Subjective   Trey Méndez is a 64 y.o. male    History of Present Illness   Patient presenting to clinic today for 6 month FUV.   Previously followed Dr. Boogie, last visit  3/25/2024  History of elevated PSA, ED, taking Sildenafil 100 mg, Intermittent scrotal pain on left side, now resolved   Family History of Prostate Cancer- Uncle     MR Prostate with larry boundaries 3/20/24: BPH changes of the transition zone. Diffuse non nodular hypointensities within the peripheral zone, without evidence of  focally restricted diffusion ( PI-RADS 2).     Scrotal Pain/ hip-leg pain  US benign, will continue to monitor for now     US scrotum w dopplers:   IMPRESSION:  Unremarkable scrotal/testicular ultrasound     Urinalysis today Negative   PVR 2 ml     Lab Results   Component Value Date    PSA 6.13 (H) 03/18/2025    PSA 7.38 (H) 09/11/2024    PSA 5.24 (H) 03/06/2024    PSA 5.90 (H) 02/20/2024    PSA 4.02 (H) 05/25/2021     Past Medical History and Surgical History   Past Medical History:   Diagnosis Date    Left lower quadrant pain 07/13/2019    Left groin pain    Personal history of other diseases of the circulatory system 01/03/2020    History of hypertension    Personal history of other diseases of the digestive system 04/08/2019    History of diverticulitis of colon    Personal history of other diseases of the musculoskeletal system and connective tissue 04/08/2019    History of backache    Personal history of other diseases of the respiratory system 03/21/2018    History of acute bronchitis    Personal history of other endocrine, nutritional and metabolic disease 01/03/2020    History of morbid obesity    Personal history of other specified conditions     History of abdominal pain     Past Surgical History:   Procedure Laterality Date    OTHER SURGICAL HISTORY  01/03/2020    Colonoscopy       Medications  Current Outpatient Medications on File Prior to Visit    Medication Sig Dispense Refill    hydroCHLOROthiazide (HYDRODiuril) 25 mg tablet Take 1 tablet (25 mg) by mouth once daily. 90 tablet 2    levothyroxine (Synthroid, Levoxyl) 25 mcg tablet Take 1 tablet (25 mcg) by mouth early in the morning.. Take on an empty stomach at the same time each day, either 30 to 60 minutes prior to breakfast 90 tablet 3    pantoprazole (ProtoNix) 40 mg EC tablet Take 1 tablet (40 mg) by mouth once daily.      psyllium (Metamucil) 3.4 gram packet Take 1 Dose by mouth in the morning and 1 Dose before bedtime.      sildenafil (Viagra) 50 mg tablet Take 1 tablet (50 mg) by mouth if needed for erectile dysfunction. TAKE 1 TABLET DAILY 1 HOUR BEFORE NEEDED 10 tablet 2    valsartan (Diovan) 320 mg tablet Take 1 tablet (320 mg) by mouth once daily. 90 tablet 2     No current facility-administered medications on file prior to visit.       Objective   Physicial Exam  General: Well developed, well nourished, alert and cooperative, appears in no acute distress  Eyes: Non-injected conjunctiva, sclera clear, no proptosis  Cardiac: Extremities are warm and well perfused. No edema, cyanosis or pallor.   Lungs: Breathing is easy, non-labored. Speaking in clear and complete sentences. Normal diaphragmatic movement.  MSK: Ambulatory with steady gait, unassisted  Neuro: alert and oriented to person, place and time  Psych: Demonstrates good judgement and reason, without hallucinations, abnormal affect or abnormal behaviors.  Skin: no obvious lesions, no rashes.    No visits with results within 1 Day(s) from this visit.   Latest known visit with results is:   Orders Only on 03/18/2025   Component Date Value Ref Range Status    PSA, TOTAL 03/18/2025 6.13 (H)  < OR = 4.00 ng/mL Final    Comment: The total PSA value from this assay system is   standardized against the WHO standard. The test   result will be approximately 20% lower when compared   to the equimolar-standardized total PSA ([facility]   [place]).  Comparison of serial PSA results should be   interpreted with this fact in mind.     This test was performed using the Siemens   chemiluminescent method. Values obtained from   different assay methods cannot be used  interchangeably. PSA levels, regardless of  value, should not be interpreted as absolute  evidence of the presence or absence of disease.        Review of Systems  All other systems have been reviewed and are negative for complaint.      Assessment and Plan     1) Elevated PSA: Discussed the different etiologies of elevated PSA, as well as the risks and benefits of screening. We discussed prostate cancer and both MRI and prostate biopsy. I counseled the patient regarding risk of infection, bleeding, etc from prostate biopsy.     PSA 3/18/2025 6.13, decreased from 7.38 on 9/11/2024    3/20/2024 MRI Pi-RADS 2   PSA in 6 months     2) Scrotal pain resolved   -US benign  -continue to monitor    3) ED  Continue Sildenafil 50 to 100 mg prn. Denies Nitroglycerin use. Does not need refill today.     All questions and concerns were addressed. Patient verbalizes understanding and has no other questions at this time.     Abbie Gupta-- DHARMESH LORA  Office Phone:  [phone]

## 2025-03-26 ENCOUNTER — APPOINTMENT (OUTPATIENT)
Dept: PODIATRY | Facility: CLINIC | Age: 65
End: 2025-03-26
Payer: COMMERCIAL

## 2025-04-29 ENCOUNTER — OFFICE VISIT (OUTPATIENT)
Dept: PRIMARY CARE | Facility: CLINIC | Age: 65
End: 2025-04-29
Payer: COMMERCIAL

## 2025-04-29 VITALS
DIASTOLIC BLOOD PRESSURE: 83 MMHG | BODY MASS INDEX: 40.69 KG/M2 | SYSTOLIC BLOOD PRESSURE: 133 MMHG | HEART RATE: 70 BPM | OXYGEN SATURATION: 97 % | WEIGHT: 300 LBS

## 2025-04-29 DIAGNOSIS — M70.52 PES ANSERINUS BURSITIS OF LEFT KNEE: Primary | ICD-10-CM

## 2025-04-29 PROCEDURE — 3079F DIAST BP 80-89 MM HG: CPT | Performed by: INTERNAL MEDICINE

## 2025-04-29 PROCEDURE — 99213 OFFICE O/P EST LOW 20 MIN: CPT | Performed by: INTERNAL MEDICINE

## 2025-04-29 PROCEDURE — 3075F SYST BP GE 130 - 139MM HG: CPT | Performed by: INTERNAL MEDICINE

## 2025-04-29 PROCEDURE — 1036F TOBACCO NON-USER: CPT | Performed by: INTERNAL MEDICINE

## 2025-04-29 RX ORDER — PREDNISONE 20 MG/1
20 TABLET ORAL DAILY
Qty: 5 TABLET | Refills: 0 | Status: SHIPPED | OUTPATIENT
Start: 2025-04-29 | End: 2025-05-04

## 2025-04-29 NOTE — PROGRESS NOTES
"Subjective   Patient ID: Blaine Méndez \"Nunu" is a 64 y.o. male who presents for Knee Pain.    HPI     Patient is a 64-year-old male who presents with chief complaint of left knee pain.  Mostly affecting the distal dull aspect of the knee bilaterally.  No recent trauma or injury.  Has been walking quite a bit.  Slight swelling on the distal aspect of the knee.  Has been using ibuprofen without relief.  Also using compression sleeve.    Review of Systems  Constitutional: No fever or chills  Cardiovascular: no chest pain, no palpitations and no syncope.   Respiratory: no cough, no shortness of breath during exertion and no shortness of breath at rest.   Gastrointestinal: no abdominal pain, no nausea and no vomiting.  Neuro: No Headache, no dizziness    Objective   /83   Pulse 70   Wt 136 kg (300 lb)   SpO2 97%   BMI 40.69 kg/m²     Physical Exam  Constitutional: Alert and in no acute distress. Well developed, well nourished  Head and Face: Head and face: Normal.    Cardiovascular: Heart rate and rhythm were normal, normal S1 and S2. No peripheral edema.   Pulmonary: No respiratory distress. Clear bilateral breath sounds.  Musculoskeletal: Gait and station: Normal. Muscle strength/tone: Normal.   Skin: Normal skin color and pigmentation, normal skin turgor, and no rash.    Psychiatric: Judgment and insight: Intact. Mood and affect: Normal.    Procedures    Lab Results   Component Value Date    WBC 8.5 12/23/2024    HGB 15.7 12/23/2024    HCT 48.2 12/23/2024     12/23/2024    CHOL 196 12/23/2024    TRIG 90 12/23/2024    HDL 46.9 12/23/2024    ALT 19 12/23/2024    AST 20 12/23/2024     12/23/2024    K 4.3 12/23/2024     12/23/2024    CREATININE 0.86 12/23/2024    BUN 15 12/23/2024    CO2 29 12/23/2024    TSH 8.32 (H) 12/23/2024    PSA 6.13 (H) 03/18/2025    INR 1.1 05/02/2019    HGBA1C 5.9 (H) 12/23/2024       XR foot left 3+ views  Narrative: Interpreted By:  Eleuterio Li,   STUDY:  XR " FOOT LEFT 3+ VIEWS  4/9/2024 8:18 am      INDICATION:  Signs/Symptoms:heel pain      COMPARISON:  None.      ACCESSION NUMBER(S):  [id number]      ORDERING CLINICIAN:  EMILIA CONDE      TECHNIQUE:  Three views of the left foot including AP , oblique and lateral  projections were obtained.      FINDINGS:  There is no radiographic evidence of acute fracture or dislocation  identified. The joint spaces are well preserved throughout without  significant degenerative changes.      Impression: 1. No acute fracture or dislocation identified.      MACRO:  None.      Signed by: Eleuterio Li 4/10/2024 11:29 AM  Dictation workstation:   [id number]            Assessment/Plan   Problem List Items Addressed This Visit    None  Visit Diagnoses         Codes      Pes anserinus bursitis of left knee    -  Primary M70.52    Relevant Medications    predniSONE (Deltasone) 20 mg tablet        Recommend compression and avoid overusing the knee.  Limit exercise to simply walking.  Ice area 3 times a day.  Follow-up if no improvement in 1 to 2 weeks

## 2025-05-16 ENCOUNTER — OFFICE VISIT (OUTPATIENT)
Dept: PRIMARY CARE | Facility: CLINIC | Age: 65
End: 2025-05-16
Payer: COMMERCIAL

## 2025-05-16 ENCOUNTER — HOSPITAL ENCOUNTER (OUTPATIENT)
Dept: RADIOLOGY | Facility: CLINIC | Age: 65
Discharge: HOME | End: 2025-05-16
Payer: COMMERCIAL

## 2025-05-16 VITALS
HEART RATE: 58 BPM | SYSTOLIC BLOOD PRESSURE: 131 MMHG | WEIGHT: 301 LBS | OXYGEN SATURATION: 96 % | DIASTOLIC BLOOD PRESSURE: 86 MMHG | BODY MASS INDEX: 40.82 KG/M2

## 2025-05-16 DIAGNOSIS — M17.12 OSTEOARTHRITIS OF LEFT KNEE, UNSPECIFIED OSTEOARTHRITIS TYPE: Primary | ICD-10-CM

## 2025-05-16 DIAGNOSIS — J45.21 MILD INTERMITTENT ASTHMA WITH ACUTE EXACERBATION (HHS-HCC): ICD-10-CM

## 2025-05-16 DIAGNOSIS — M17.12 OSTEOARTHRITIS OF LEFT KNEE, UNSPECIFIED OSTEOARTHRITIS TYPE: ICD-10-CM

## 2025-05-16 PROCEDURE — 73562 X-RAY EXAM OF KNEE 3: CPT | Mod: LT

## 2025-05-16 PROCEDURE — 3079F DIAST BP 80-89 MM HG: CPT | Performed by: INTERNAL MEDICINE

## 2025-05-16 PROCEDURE — 99213 OFFICE O/P EST LOW 20 MIN: CPT | Performed by: INTERNAL MEDICINE

## 2025-05-16 PROCEDURE — 1036F TOBACCO NON-USER: CPT | Performed by: INTERNAL MEDICINE

## 2025-05-16 PROCEDURE — 3075F SYST BP GE 130 - 139MM HG: CPT | Performed by: INTERNAL MEDICINE

## 2025-05-16 RX ORDER — PREDNISONE 20 MG/1
20 TABLET ORAL DAILY
Qty: 7 TABLET | Refills: 0 | Status: SHIPPED | OUTPATIENT
Start: 2025-05-16 | End: 2025-05-23

## 2025-05-16 NOTE — PROGRESS NOTES
"Subjective   Patient ID: Blaine Méndez \"Nunu" is a 64 y.o. male who presents for Knee Pain.    HPI     Patient is a 64-year-old male with past medical history of hypertension presents with chief complaint of left-sided knee pain.  He was seen 2 weeks ago started on Medrol with significant improvement however after stopping the medication the pain returned.  Mostly affecting the anterior aspects of the knee proximally.  No significant swelling.  No trauma or injury      Review of Systems  Constitutional: No fever or chills  Cardiovascular: no chest pain, no palpitations and no syncope.   Respiratory: no cough, no shortness of breath during exertion and no shortness of breath at rest.   Gastrointestinal: no abdominal pain, no nausea and no vomiting.  Neuro: No Headache, no dizziness    Objective   /86   Pulse 58   Wt 137 kg (301 lb)   SpO2 96%   BMI 40.82 kg/m²     Physical Exam  Constitutional: Alert and in no acute distress. Well developed, well nourished  Head and Face: Head and face: Normal.    Cardiovascular: Heart rate and rhythm were normal, normal S1 and S2. No peripheral edema.   Pulmonary: No respiratory distress. Clear bilateral breath sounds.  Musculoskeletal: Gait and station: Normal. Muscle strength/tone: Normal.   Skin: Normal skin color and pigmentation, normal skin turgor, and no rash.    Psychiatric: Judgment and insight: Intact. Mood and affect: Normal.    Procedures    Lab Results   Component Value Date    WBC 8.5 12/23/2024    HGB 15.7 12/23/2024    HCT 48.2 12/23/2024     12/23/2024    CHOL 196 12/23/2024    TRIG 90 12/23/2024    HDL 46.9 12/23/2024    ALT 19 12/23/2024    AST 20 12/23/2024     12/23/2024    K 4.3 12/23/2024     12/23/2024    CREATININE 0.86 12/23/2024    BUN 15 12/23/2024    CO2 29 12/23/2024    TSH 8.32 (H) 12/23/2024    PSA 6.13 (H) 03/18/2025    INR 1.1 05/02/2019    HGBA1C 5.9 (H) 12/23/2024       XR foot left 3+ views  Narrative: Interpreted " By:  Eleuterio Li,   STUDY:  XR FOOT LEFT 3+ VIEWS  4/9/2024 8:18 am      INDICATION:  Signs/Symptoms:heel pain      COMPARISON:  None.      ACCESSION NUMBER(S):  [id number]      ORDERING CLINICIAN:  EMILIA CONDE      TECHNIQUE:  Three views of the left foot including AP , oblique and lateral  projections were obtained.      FINDINGS:  There is no radiographic evidence of acute fracture or dislocation  identified. The joint spaces are well preserved throughout without  significant degenerative changes.      Impression: 1. No acute fracture or dislocation identified.      MACRO:  None.      Signed by: Eleuterio Li 4/10/2024 11:29 AM  Dictation workstation:   [id number]            Assessment/Plan   Problem List Items Addressed This Visit    None  Visit Diagnoses         Codes      Osteoarthritis of left knee, unspecified osteoarthritis type    -  Primary M17.12    Relevant Medications    predniSONE (Deltasone) 20 mg tablet    Other Relevant Orders    Referral to Orthopedics and Sports Medicine    XR knee left 3 views          Start prednisone will check x-ray of the knee.  Possible osteoarthritis versus ligament injury.  Encouraged icing.  Referral to orthopedist for evaluation and possible steroid injection

## 2025-05-27 ENCOUNTER — APPOINTMENT (OUTPATIENT)
Dept: ORTHOPEDIC SURGERY | Facility: CLINIC | Age: 65
End: 2025-05-27
Payer: COMMERCIAL

## 2025-05-27 ENCOUNTER — HOSPITAL ENCOUNTER (OUTPATIENT)
Dept: RADIOLOGY | Facility: EXTERNAL LOCATION | Age: 65
Discharge: HOME | End: 2025-05-27

## 2025-05-27 VITALS — HEIGHT: 72 IN | BODY MASS INDEX: 40.63 KG/M2 | WEIGHT: 300 LBS

## 2025-05-27 DIAGNOSIS — M17.12 OSTEOARTHRITIS OF LEFT KNEE, UNSPECIFIED OSTEOARTHRITIS TYPE: Primary | ICD-10-CM

## 2025-05-27 DIAGNOSIS — M25.562 ACUTE PAIN OF LEFT KNEE: ICD-10-CM

## 2025-05-27 PROCEDURE — 3008F BODY MASS INDEX DOCD: CPT | Performed by: FAMILY MEDICINE

## 2025-05-27 PROCEDURE — 20611 DRAIN/INJ JOINT/BURSA W/US: CPT | Performed by: FAMILY MEDICINE

## 2025-05-27 PROCEDURE — 1036F TOBACCO NON-USER: CPT | Performed by: FAMILY MEDICINE

## 2025-05-27 PROCEDURE — 99203 OFFICE O/P NEW LOW 30 MIN: CPT | Performed by: FAMILY MEDICINE

## 2025-05-27 RX ORDER — LIDOCAINE HYDROCHLORIDE 20 MG/ML
5 INJECTION, SOLUTION INFILTRATION; PERINEURAL
Status: COMPLETED | OUTPATIENT
Start: 2025-05-27 | End: 2025-05-27

## 2025-05-27 RX ORDER — TRIAMCINOLONE ACETONIDE 40 MG/ML
40 INJECTION, SUSPENSION INTRA-ARTICULAR; INTRAMUSCULAR
Status: COMPLETED | OUTPATIENT
Start: 2025-05-27 | End: 2025-05-27

## 2025-05-27 RX ADMIN — TRIAMCINOLONE ACETONIDE 40 MG: 40 INJECTION, SUSPENSION INTRA-ARTICULAR; INTRAMUSCULAR at 15:26

## 2025-05-27 RX ADMIN — LIDOCAINE HYDROCHLORIDE 5 ML: 20 INJECTION, SOLUTION INFILTRATION; PERINEURAL at 15:26

## 2025-05-27 NOTE — PROGRESS NOTES
History of Present Illness   Chief Complaint   Patient presents with    Left Knee - Pain     Nki  Pain x 6 weeks +swelling        The patient is 64 y.o. male  here with a complaint of left knee pain, referred by PCP, Jt Cardenas.  Atraumatic onset of symptoms around 6 weeks ago.  Patient is active walking his dog on average 6 miles a day in the summer.  Noticed some discomfort walking his dog in late April.  Had some worsening pain after this.  See his PCP who initially started on some oral steroids, he had improvement in symptoms following this, says that he was feeling nearly back to normal, says that he attempted to get back into walking his dog and had recurrence of pain.  Did follow back up with his PCP who ordered x-rays that showed some minimal degenerative changes, recommended outpatient orthopedic follow-up.  He admits to some pain over the medial knee and posterior knee, admits to swelling of his knee, he has pain with walking, bending his knee, pain is better at rest.  He has been using over-the-counter medications after completion of oral steroids.  He denies any significant knee problems in the past.    Medical History[1]    Medication Documentation Review Audit       Reviewed by Jt Cardenas DO (Physician) on 05/16/25 at 1116      Medication Order Taking? Sig Documenting Provider Last Dose Status   hydroCHLOROthiazide (HYDRODiuril) 25 mg tablet [id number]  Take 1 tablet (25 mg) by mouth once daily. Jt Cardenas DO  Active   levothyroxine (Synthroid, Levoxyl) 25 mcg tablet [id number]  Take 1 tablet (25 mcg) by mouth early in the morning.. Take on an empty stomach at the same time each day, either 30 to 60 minutes prior to breakfast Jt Cardenas DO  Active   pantoprazole (ProtoNix) 40 mg EC tablet [id number]  Take 1 tablet (40 mg) by mouth once daily. Historical Provider, MD  Active   psyllium (Metamucil) 3.4 gram packet [id number]  Take 1 Dose by mouth in the morning and 1 Dose before bedtime. Historical  Provider, MD  Active   sildenafil (Viagra) 100 mg tablet [id number]  Take 1 tablet (100 mg) by mouth if needed for erectile dysfunction. Abbie Gupta, APRN-CNP   25 [id number]   valsartan (Diovan) 320 mg tablet [id number]  Take 1 tablet (320 mg) by mouth once daily. Jt Cardenas, DO  Active                    RX Allergies[2]    Social History     Socioeconomic History    Marital status:      Spouse name: Not on file    Number of children: Not on file    Years of education: Not on file    Highest education level: Not on file   Occupational History    Not on file   Tobacco Use    Smoking status: Never     Passive exposure: Never    Smokeless tobacco: Never   Substance and Sexual Activity    Alcohol use: Defer    Drug use: Never    Sexual activity: Not on file   Other Topics Concern    Not on file   Social History Narrative    Not on file     Social Drivers of Health     Financial Resource Strain: Not on file   Food Insecurity: Not on file   Transportation Needs: Not on file   Physical Activity: Not on file   Stress: Not on file   Social Connections: Not on file   Intimate Partner Violence: Not on file   Housing Stability: Not on file       Surgical History[3]       Review of Systems   GENERAL: Negative  GI: Negative  MUSCULOSKELETAL: See HPI  SKIN: Negative  NEURO:  Negative     Physical Exam:    General/Constitutional: well appearing, no distress, appears stated age  HEENT: sclera clear  Respiratory: non labored breathing  Vascular: No edema, swelling or tenderness, except as noted in detailed exam.  Integumentary: No impressive skin lesions present, except as noted in detailed exam.  Neurological:  Alert and oriented   Psychological:  Normal mood and affect.  Musculoskeletal: Normal, except as noted in detailed exam and in HPI.  Antalgic gait, unassisted    Left knee: Normal appearance, there is no swelling or skin changes.  There is a moderate joint effusion.  There is some mild medial and lateral  joint tenderness.  Range of motion from 3 to 105 degrees with pain at endrange of flexion.  No motor deficits present.  There is some discomfort with Guero testing.  Negative Lachman, negative posterior drawer.  Stable to varus and valgus stress.       Imaging: X-rays of left knee from 5/16/2025 independently reviewed, there is some mild degenerative changes with some mild medial joint space narrowing, there is joint effusion seen on lateral view, no acute abnormalities.      L Inj/Asp: L knee on 5/27/2025 3:26 PM  Indications: pain  Details: 22 G needle, ultrasound-guided superolateral approach  Medications: 40 mg triamcinolone acetonide 40 mg/mL; 5 mL lidocaine 20 mg/mL (2 %)  Aspirate: 40 mL clear and yellow  Outcome: tolerated well, no immediate complications    Left knee joint and surrounding structures were appropriately visualized before and during injection    Ultrasound images were permanently uploaded to the medical record/PACS  Procedure, treatment alternatives, risks and benefits explained, specific risks discussed. Consent was given by the patient. Immediately prior to procedure a time out was called to verify the correct patient, procedure, equipment, support staff and site/side marked as required. Patient was prepped and draped in the usual sterile fashion.               Assessment   1. Osteoarthritis of left knee, unspecified osteoarthritis type  Referral to Orthopedics and Sports Medicine      2. Acute pain of left knee  Point of Care Ultrasound            Plan: Left knee pain with effusion thought to be secondary to aggravation of mild underlying osteoarthritis, discussed further workup and treatment.  We did proceed with ultrasound-guided left knee aspiration followed by Kenalog injection, approximately 40 cc of joint fluid was aspirated prior to injection, normal in appearance.  No concern for infection or other inflammatory arthropathy.  Hopefully he will continue to improve following this,  he did admit to some improvement following aspiration and injection in the office today.  He will monitor for continued improvement in symptoms over the next few weeks.  He can return back to walking as tolerated.  He will follow-up as symptoms dictate.         [1]   Past Medical History:  Diagnosis Date    Left lower quadrant pain 07/13/2019    Left groin pain    Personal history of other diseases of the circulatory system 01/03/2020    History of hypertension    Personal history of other diseases of the digestive system 04/08/2019    History of diverticulitis of colon    Personal history of other diseases of the musculoskeletal system and connective tissue 04/08/2019    History of backache    Personal history of other diseases of the respiratory system 03/21/2018    History of acute bronchitis    Personal history of other endocrine, nutritional and metabolic disease 01/03/2020    History of morbid obesity    Personal history of other specified conditions     History of abdominal pain   [2] No Known Allergies  [3]   Past Surgical History:  Procedure Laterality Date    OTHER SURGICAL HISTORY  01/03/2020    Colonoscopy

## 2025-05-27 NOTE — LETTER
May 27, 2025     Jt Cardenas DO  [address]  [address]  [facility] [address]    Patient: Trey Méndez   YOB: 1960   Date of Visit: 5/27/2025       Dear Dr. Jt Cardenas DO:    Thank you for referring Trey Méndez to me for evaluation. Below are my notes for this consultation.  If you have questions, please do not hesitate to call me. I look forward to following your patient along with you.       Sincerely,     Dewey Mathew MD      CC: No Recipients  ______________________________________________________________________________________      History of Present Illness   Chief Complaint   Patient presents with   • Left Knee - Pain     Nki  Pain x 6 weeks +swelling        The patient is 64 y.o. male  here with a complaint of left knee pain, referred by PCP, Jt Cardenas.  Atraumatic onset of symptoms around 6 weeks ago.  Patient is active walking his dog on average 6 miles a day in the summer.  Noticed some discomfort walking his dog in late April.  Had some worsening pain after this.  See his PCP who initially started on some oral steroids, he had improvement in symptoms following this, says that he was feeling nearly back to normal, says that he attempted to get back into walking his dog and had recurrence of pain.  Did follow back up with his PCP who ordered x-rays that showed some minimal degenerative changes, recommended outpatient orthopedic follow-up.  He admits to some pain over the medial knee and posterior knee, admits to swelling of his knee, he has pain with walking, bending his knee, pain is better at rest.  He has been using over-the-counter medications after completion of oral steroids.  He denies any significant knee problems in the past.    Medical History[1]    Medication Documentation Review Audit       Reviewed by Jt Cardenas DO (Physician) on 05/16/25 at 1116      Medication Order Taking? Sig Documenting Provider Last Dose Status   hydroCHLOROthiazide (HYDRODiuril) 25 mg  tablet [id number]  Take 1 tablet (25 mg) by mouth once daily. Jt Cardenas, DO  Active   levothyroxine (Synthroid, Levoxyl) 25 mcg tablet [id number]  Take 1 tablet (25 mcg) by mouth early in the morning.. Take on an empty stomach at the same time each day, either 30 to 60 minutes prior to breakfast Jt Cardenas DO  Active   pantoprazole (ProtoNix) 40 mg EC tablet [id number]  Take 1 tablet (40 mg) by mouth once daily. Historical Provider, MD  Active   psyllium (Metamucil) 3.4 gram packet [id number]  Take 1 Dose by mouth in the morning and 1 Dose before bedtime. Historical Provider, MD  Active   sildenafil (Viagra) 100 mg tablet [id number]  Take 1 tablet (100 mg) by mouth if needed for erectile dysfunction. Abbie Gupta, APRN-CNP   25 [id number]   valsartan (Diovan) 320 mg tablet [id number]  Take 1 tablet (320 mg) by mouth once daily. Jt Cardenas DO  Active                    RX Allergies[2]    Social History     Socioeconomic History   • Marital status:      Spouse name: Not on file   • Number of children: Not on file   • Years of education: Not on file   • Highest education level: Not on file   Occupational History   • Not on file   Tobacco Use   • Smoking status: Never     Passive exposure: Never   • Smokeless tobacco: Never   Substance and Sexual Activity   • Alcohol use: Defer   • Drug use: Never   • Sexual activity: Not on file   Other Topics Concern   • Not on file   Social History Narrative   • Not on file     Social Drivers of Health     Financial Resource Strain: Not on file   Food Insecurity: Not on file   Transportation Needs: Not on file   Physical Activity: Not on file   Stress: Not on file   Social Connections: Not on file   Intimate Partner Violence: Not on file   Housing Stability: Not on file       Surgical History[3]       Review of Systems   GENERAL: Negative  GI: Negative  MUSCULOSKELETAL: See HPI  SKIN: Negative  NEURO:  Negative     Physical Exam:    General/Constitutional: well  appearing, no distress, appears stated age  HEENT: sclera clear  Respiratory: non labored breathing  Vascular: No edema, swelling or tenderness, except as noted in detailed exam.  Integumentary: No impressive skin lesions present, except as noted in detailed exam.  Neurological:  Alert and oriented   Psychological:  Normal mood and affect.  Musculoskeletal: Normal, except as noted in detailed exam and in HPI.  Antalgic gait, unassisted    Left knee: Normal appearance, there is no swelling or skin changes.  There is a moderate joint effusion.  There is some mild medial and lateral joint tenderness.  Range of motion from 3 to 105 degrees with pain at endrange of flexion.  No motor deficits present.  There is some discomfort with Guero testing.  Negative Lachman, negative posterior drawer.  Stable to varus and valgus stress.       Imaging: X-rays of left knee from 5/16/2025 independently reviewed, there is some mild degenerative changes with some mild medial joint space narrowing, there is joint effusion seen on lateral view, no acute abnormalities.      L Inj/Asp: L knee on 5/27/2025 3:26 PM  Indications: pain  Details: 22 G needle, ultrasound-guided superolateral approach  Medications: 40 mg triamcinolone acetonide 40 mg/mL; 5 mL lidocaine 20 mg/mL (2 %)  Aspirate: 40 mL clear and yellow  Outcome: tolerated well, no immediate complications    Left knee joint and surrounding structures were appropriately visualized before and during injection    Ultrasound images were permanently uploaded to the medical record/PACS  Procedure, treatment alternatives, risks and benefits explained, specific risks discussed. Consent was given by the patient. Immediately prior to procedure a time out was called to verify the correct patient, procedure, equipment, support staff and site/side marked as required. Patient was prepped and draped in the usual sterile fashion.               Assessment   1. Osteoarthritis of left knee,  unspecified osteoarthritis type  Referral to Orthopedics and Sports Medicine      2. Acute pain of left knee  Point of Care Ultrasound            Plan: Left knee pain with effusion thought to be secondary to aggravation of mild underlying osteoarthritis, discussed further workup and treatment.  We did proceed with ultrasound-guided left knee aspiration followed by Kenalog injection, approximately 40 cc of joint fluid was aspirated prior to injection, normal in appearance.  No concern for infection or other inflammatory arthropathy.  Hopefully he will continue to improve following this, he did admit to some improvement following aspiration and injection in the office today.  He will monitor for continued improvement in symptoms over the next few weeks.  He can return back to walking as tolerated.  He will follow-up as symptoms dictate.         [1]  Past Medical History:  Diagnosis Date   • Left lower quadrant pain 07/13/2019    Left groin pain   • Personal history of other diseases of the circulatory system 01/03/2020    History of hypertension   • Personal history of other diseases of the digestive system 04/08/2019    History of diverticulitis of colon   • Personal history of other diseases of the musculoskeletal system and connective tissue 04/08/2019    History of backache   • Personal history of other diseases of the respiratory system 03/21/2018    History of acute bronchitis   • Personal history of other endocrine, nutritional and metabolic disease 01/03/2020    History of morbid obesity   • Personal history of other specified conditions     History of abdominal pain   [2]  No Known Allergies  [3]  Past Surgical History:  Procedure Laterality Date   • OTHER SURGICAL HISTORY  01/03/2020    Colonoscopy

## 2025-05-28 ENCOUNTER — OFFICE (OUTPATIENT)
Dept: URBAN - METROPOLITAN AREA CLINIC 26 | Facility: CLINIC | Age: 65
End: 2025-05-28
Payer: COMMERCIAL

## 2025-05-28 VITALS
SYSTOLIC BLOOD PRESSURE: 145 MMHG | DIASTOLIC BLOOD PRESSURE: 91 MMHG | HEIGHT: 72 IN | TEMPERATURE: 98.2 F | WEIGHT: 304 LBS

## 2025-05-28 DIAGNOSIS — K57.30 DIVERTICULOSIS OF LARGE INTESTINE WITHOUT PERFORATION OR ABS: ICD-10-CM

## 2025-05-28 DIAGNOSIS — K22.70 BARRETT'S ESOPHAGUS WITHOUT DYSPLASIA: ICD-10-CM

## 2025-05-28 DIAGNOSIS — K62.89 OTHER SPECIFIED DISEASES OF ANUS AND RECTUM: ICD-10-CM

## 2025-05-28 PROCEDURE — 99213 OFFICE O/P EST LOW 20 MIN: CPT | Performed by: NURSE PRACTITIONER

## 2025-05-28 RX ORDER — HYDROCORTISONE ACETATE 25 MG/1
25 SUPPOSITORY RECTAL
Qty: 10 | Refills: 0 | Status: ACTIVE
Start: 2025-05-28

## 2025-06-06 ENCOUNTER — APPOINTMENT (OUTPATIENT)
Dept: ORTHOPEDIC SURGERY | Facility: CLINIC | Age: 65
End: 2025-06-06
Payer: COMMERCIAL

## 2025-06-06 DIAGNOSIS — M25.562 ACUTE PAIN OF LEFT KNEE: ICD-10-CM

## 2025-06-06 PROCEDURE — 1036F TOBACCO NON-USER: CPT | Performed by: FAMILY MEDICINE

## 2025-06-06 PROCEDURE — 99214 OFFICE O/P EST MOD 30 MIN: CPT | Performed by: FAMILY MEDICINE

## 2025-06-06 RX ORDER — MELOXICAM 15 MG/1
15 TABLET ORAL DAILY
Qty: 30 TABLET | Refills: 0 | Status: SHIPPED | OUTPATIENT
Start: 2025-06-06

## 2025-06-06 NOTE — PROGRESS NOTES
History of Present Illness   Chief Complaint   Patient presents with    Left Knee - Follow-up       The patient is 64 y.o. male  here for follow-up of left knee pain.  Patient was seen by me approximately 2 weeks ago, referred by PCP for some ongoing knee pain which started in late April, atraumatic in nature.  X-ray showed some minimal degenerative changes, short-term response to oral steroids.  At his initial visit symptoms were thought to be secondary to underlying osteoarthritis, he was treated with ultrasound-guided knee aspiration followed by Kenalog injection with the approximately 40 mm of normal-appearing joint fluid aspirated from his knee.  Recommended follow-up on an as-needed basis.  Unfortunately he says that injection was of minimal benefit.  He admits ongoing pain in the knee.  Pain remains exacerbated by walking, he has not been able to return to walking his dog, typically walking 6 miles per day, says he is having a hard time just walking to the mailbox.  Pain remains over the medial aspect of his knee, he feels like swelling has not returned.  He denies any locking or catching, some mild instability.  He is going to [place] in approximately 11 weeks and is hopeful to be feeling better for this trip      Medical History[1]    Medication Documentation Review Audit       Reviewed by Dewey Mathew MD (Physician) on 05/27/25 at 1527      Medication Order Taking? Sig Documenting Provider Last Dose Status   hydroCHLOROthiazide (HYDRODiuril) 25 mg tablet [id number]  Take 1 tablet (25 mg) by mouth once daily. Jt Cardenas, DO  Active   levothyroxine (Synthroid, Levoxyl) 25 mcg tablet [id number]  Take 1 tablet (25 mcg) by mouth early in the morning.. Take on an empty stomach at the same time each day, either 30 to 60 minutes prior to breakfast Jt Cardenas, DO  Active   pantoprazole (ProtoNix) 40 mg EC tablet [id number]  Take 1 tablet (40 mg) by mouth once daily. Historical Provider, MD  Active   predniSONE  (Deltasone) 20 mg tablet [id number]  Take 1 tablet (20 mg) by mouth once daily for 7 days. Jt Cardenas DO   25 235   psyllium (Metamucil) 3.4 gram packet [id number]  Take 1 Dose by mouth in the morning and 1 Dose before bedtime. Historical Provider, MD  Active   sildenafil (Viagra) 100 mg tablet [id number]  Take 1 tablet (100 mg) by mouth if needed for erectile dysfunction. Abbie Gupta, APRN-CNP   25 2359   valsartan (Diovan) 320 mg tablet [id number]  Take 1 tablet (320 mg) by mouth once daily. Jt Cardenas DO  Active                    RX Allergies[2]    Social History     Socioeconomic History    Marital status:      Spouse name: Not on file    Number of children: Not on file    Years of education: Not on file    Highest education level: Not on file   Occupational History    Not on file   Tobacco Use    Smoking status: Never     Passive exposure: Never    Smokeless tobacco: Never   Substance and Sexual Activity    Alcohol use: Defer    Drug use: Never    Sexual activity: Not on file   Other Topics Concern    Not on file   Social History Narrative    Not on file     Social Drivers of Health     Financial Resource Strain: Not on file   Food Insecurity: Not on file   Transportation Needs: Not on file   Physical Activity: Not on file   Stress: Not on file   Social Connections: Not on file   Intimate Partner Violence: Not on file   Housing Stability: Not on file       Surgical History[3]       Review of Systems   GENERAL: Negative  GI: Negative  MUSCULOSKELETAL: See HPI  SKIN: Negative  NEURO:  Negative     Physical Exam:    General/Constitutional: well appearing, no distress, appears stated age  HEENT: sclera clear  Respiratory: non labored breathing  Vascular: No edema, swelling or tenderness, except as noted in detailed exam.  Integumentary: No impressive skin lesions present, except as noted in detailed exam.  Neurological:  Alert and oriented   Psychological:  Normal mood and  affect.  Musculoskeletal: Normal, except as noted in detailed exam and in HPI.  Antalgic gait, unassisted    Left knee: Normal appearance, there is no swelling or skin changes.  There is a trace residual joint effusion.  There is medial joint line tenderness.  There is no significant tenderness at the medial distal femur or medial proximal tibia.  Range of motion from 3 to 115 degrees with pain at endrange of flexion.  No motor deficits present.  There is some discomfort with Guero testing.  Negative Lachman, negative posterior drawer.  Stable to varus and valgus stress.       Imaging: No new imaging today, previous x-ray showed minimal degenerative changes with some mild medial joint space narrowing with joint effusion.        Assessment   1. Acute pain of left knee  MR knee left wo IV contrast    meloxicam (Mobic) 15 mg tablet              Plan: Left knee pain with effusion, initial symptoms thought to be aggravation of mild degenerative changes however there has been minimal response to recent injection, on exam there is no significant bony tenderness to raise suspicion for insufficiency fracture but this does remain in the differential.  Degenerative medial meniscus tear is also in the differential based on focal medial joint pain.  Given degree of discomfort, difficulty walking/weightbearing I would like to obtain an MRI of his knee to evaluate for medial meniscus tear or other derangement that may require surgical intervention.  I did prescribe him some meloxicam to assist in pain control in the short-term.  He was provided some home exercises that he will begin in the short-term as tolerated.  Further treatment pending MRI results.         [1]   Past Medical History:  Diagnosis Date    Left lower quadrant pain 07/13/2019    Left groin pain    Personal history of other diseases of the circulatory system 01/03/2020    History of hypertension    Personal history of other diseases of the digestive system  04/08/2019    History of diverticulitis of colon    Personal history of other diseases of the musculoskeletal system and connective tissue 04/08/2019    History of backache    Personal history of other diseases of the respiratory system 03/21/2018    History of acute bronchitis    Personal history of other endocrine, nutritional and metabolic disease 01/03/2020    History of morbid obesity    Personal history of other specified conditions     History of abdominal pain   [2] No Known Allergies  [3]   Past Surgical History:  Procedure Laterality Date    OTHER SURGICAL HISTORY  01/03/2020    Colonoscopy

## 2025-06-10 ENCOUNTER — APPOINTMENT (OUTPATIENT)
Dept: PRIMARY CARE | Facility: CLINIC | Age: 65
End: 2025-06-10
Payer: COMMERCIAL

## 2025-06-17 ENCOUNTER — APPOINTMENT (OUTPATIENT)
Dept: RADIOLOGY | Facility: CLINIC | Age: 65
End: 2025-06-17
Payer: COMMERCIAL

## 2025-06-18 ENCOUNTER — HOSPITAL ENCOUNTER (OUTPATIENT)
Dept: RADIOLOGY | Facility: HOSPITAL | Age: 65
Discharge: HOME | End: 2025-06-18
Payer: COMMERCIAL

## 2025-06-18 DIAGNOSIS — M25.562 ACUTE PAIN OF LEFT KNEE: ICD-10-CM

## 2025-06-18 PROCEDURE — 73721 MRI JNT OF LWR EXTRE W/O DYE: CPT | Mod: LT

## 2025-06-18 PROCEDURE — 73721 MRI JNT OF LWR EXTRE W/O DYE: CPT | Mod: LEFT SIDE | Performed by: RADIOLOGY

## 2025-06-20 ENCOUNTER — APPOINTMENT (OUTPATIENT)
Dept: ORTHOPEDIC SURGERY | Facility: CLINIC | Age: 65
End: 2025-06-20
Payer: COMMERCIAL

## 2025-06-23 ENCOUNTER — APPOINTMENT (OUTPATIENT)
Dept: RADIOLOGY | Facility: CLINIC | Age: 65
End: 2025-06-23
Payer: COMMERCIAL

## 2025-06-27 ENCOUNTER — APPOINTMENT (OUTPATIENT)
Dept: ORTHOPEDIC SURGERY | Facility: CLINIC | Age: 65
End: 2025-06-27
Payer: COMMERCIAL

## 2025-07-01 ENCOUNTER — OFFICE VISIT (OUTPATIENT)
Dept: ORTHOPEDIC SURGERY | Facility: CLINIC | Age: 65
End: 2025-07-01
Payer: COMMERCIAL

## 2025-07-01 VITALS — BODY MASS INDEX: 40.63 KG/M2 | WEIGHT: 300 LBS | HEIGHT: 72 IN

## 2025-07-01 DIAGNOSIS — M17.12 ARTHRITIS OF LEFT KNEE: ICD-10-CM

## 2025-07-01 DIAGNOSIS — S83.232A COMPLEX TEAR OF MEDIAL MENISCUS OF LEFT KNEE AS CURRENT INJURY, INITIAL ENCOUNTER: Primary | ICD-10-CM

## 2025-07-01 PROCEDURE — 1036F TOBACCO NON-USER: CPT | Performed by: ORTHOPAEDIC SURGERY

## 2025-07-01 PROCEDURE — 99202 OFFICE O/P NEW SF 15 MIN: CPT | Performed by: ORTHOPAEDIC SURGERY

## 2025-07-01 PROCEDURE — 3008F BODY MASS INDEX DOCD: CPT | Performed by: ORTHOPAEDIC SURGERY

## 2025-07-01 PROCEDURE — 99204 OFFICE O/P NEW MOD 45 MIN: CPT | Performed by: ORTHOPAEDIC SURGERY

## 2025-07-01 NOTE — PROGRESS NOTES
64-year-old is seen with left knee pain.  He has been having persistent moderate throbbing pain in the left knee.  He has been having pain since April.  He has difficulty with standing and walking going up and down stairs and getting up and down from a chair in and out of a car.  He has locking and swelling and giving way.  Typically he would walk 4 to 5 miles at a time but has had increased difficulty due to this.  He had cortisone injection May 27, 2025 with Dr. Mathew that gave him short-term relief.  He also had aspiration at that time.  He has a trip to [place] planned in September.    Pleasant and in no acute distress. Ambulates with a mildly antalgic gait.  Left knee range of motion is 3-120. There is a mild effusion and no instability. The knee is stable to varus and valgus stress Lachman and posterior drawer. There is medial joint line tenderness and  Guero’s is positive with pain medially.  Right knee range of motion is 0-130 without effusion or instability. There is no tenderness around the right knee. Bilateral lower extremities are well-perfused the skin is intact and muscle tone is adequate.  There is adequate range of motion of his hips without significant pain.    Multiple x-ray views of the left knee are personally reviewed and there is no acute bony abnormality.    MRI of the left knee is personally reviewed and there is complex tear involving the body and posterior horn of the medial meniscus.  There is mild chondral change involving the medial compartment.    A detailed discussion about the meniscus tear was done.  Treatment options including no treatment reviewed and the decision was made to proceed with left knee arthroscopy and partial medial meniscectomy.  The surgery and postoperative course reviewed in detail.  Risks include not limited to infection thromboembolus neurovascular medical problems stiffness and limitations of arthroscopy were discussed and he understands this and has  elected to proceed.  He will avoid aggravating activities in the meantime.

## 2025-07-01 NOTE — LETTER
July 1, 2025     Jt Cardenas DO  [address]  [address]  [facility] [address]    Patient: Trey Méndez   YOB: 1960   Date of Visit: 7/1/2025       Dear Dr. Jt Cardenas DO:    Thank you for referring Trey Méndez to me for evaluation. Below are my notes for this consultation.  If you have questions, please do not hesitate to call me. I look forward to following your patient along with you.       Sincerely,     Guille Lerner MD      CC: No Recipients  ______________________________________________________________________________________    64-year-old is seen with left knee pain.  He has been having persistent moderate throbbing pain in the left knee.  He has been having pain since April.  He has difficulty with standing and walking going up and down stairs and getting up and down from a chair in and out of a car.  He has locking and swelling and giving way.  Typically he would walk 4 to 5 miles at a time but has had increased difficulty due to this.  He had cortisone injection May 27, 2025 with Dr. Mathew that gave him short-term relief.  He also had aspiration at that time.  He has a trip to [place] planned in September.    Pleasant and in no acute distress. Ambulates with a mildly antalgic gait.  Left knee range of motion is 3-120. There is a mild effusion and no instability. The knee is stable to varus and valgus stress Lachman and posterior drawer. There is medial joint line tenderness and  Guero’s is positive with pain medially.  Right knee range of motion is 0-130 without effusion or instability. There is no tenderness around the right knee. Bilateral lower extremities are well-perfused the skin is intact and muscle tone is adequate.  There is adequate range of motion of his hips without significant pain.    Multiple x-ray views of the left knee are personally reviewed and there is no acute bony abnormality.    MRI of the left knee is personally reviewed and there is  complex tear involving the body and posterior horn of the medial meniscus.  There is mild chondral change involving the medial compartment.    A detailed discussion about the meniscus tear was done.  Treatment options including no treatment reviewed and the decision was made to proceed with left knee arthroscopy and partial medial meniscectomy.  The surgery and postoperative course reviewed in detail.  Risks include not limited to infection thromboembolus neurovascular medical problems stiffness and limitations of arthroscopy were discussed and he understands this and has elected to proceed.  He will avoid aggravating activities in the meantime.

## 2025-07-02 DIAGNOSIS — S83.232A COMPLEX TEAR OF MEDIAL MENISCUS OF LEFT KNEE AS CURRENT INJURY, INITIAL ENCOUNTER: ICD-10-CM

## 2025-07-02 PROBLEM — M17.12 ARTHRITIS OF LEFT KNEE: Status: ACTIVE | Noted: 2025-07-01

## 2025-07-24 ENCOUNTER — PRE-ADMISSION TESTING (OUTPATIENT)
Dept: PREADMISSION TESTING | Facility: HOSPITAL | Age: 65
End: 2025-07-24
Payer: COMMERCIAL

## 2025-07-24 VITALS
WEIGHT: 303.57 LBS | RESPIRATION RATE: 18 BRPM | BODY MASS INDEX: 41.12 KG/M2 | DIASTOLIC BLOOD PRESSURE: 82 MMHG | OXYGEN SATURATION: 98 % | TEMPERATURE: 97.3 F | HEART RATE: 68 BPM | SYSTOLIC BLOOD PRESSURE: 132 MMHG | HEIGHT: 72 IN

## 2025-07-24 DIAGNOSIS — Z01.818 PRE-OP TESTING: Primary | ICD-10-CM

## 2025-07-24 LAB
ANION GAP SERPL CALC-SCNC: 14 MMOL/L (ref 10–20)
BUN SERPL-MCNC: 15 MG/DL (ref 6–23)
CALCIUM SERPL-MCNC: 9.6 MG/DL (ref 8.6–10.3)
CHLORIDE SERPL-SCNC: 101 MMOL/L (ref 98–107)
CO2 SERPL-SCNC: 27 MMOL/L (ref 21–32)
CREAT SERPL-MCNC: 0.88 MG/DL (ref 0.5–1.3)
EGFRCR SERPLBLD CKD-EPI 2021: >90 ML/MIN/1.73M*2
ERYTHROCYTE [DISTWIDTH] IN BLOOD BY AUTOMATED COUNT: 14 % (ref 11.5–14.5)
GLUCOSE SERPL-MCNC: 83 MG/DL (ref 74–99)
HCT VFR BLD AUTO: 48.1 % (ref 41–52)
HGB BLD-MCNC: 16.1 G/DL (ref 13.5–17.5)
MCH RBC QN AUTO: 30.6 PG (ref 26–34)
MCHC RBC AUTO-ENTMCNC: 33.5 G/DL (ref 32–36)
MCV RBC AUTO: 91 FL (ref 80–100)
NRBC BLD-RTO: 0 /100 WBCS (ref 0–0)
PLATELET # BLD AUTO: 255 X10*3/UL (ref 150–450)
POTASSIUM SERPL-SCNC: 3.9 MMOL/L (ref 3.5–5.3)
RBC # BLD AUTO: 5.27 X10*6/UL (ref 4.5–5.9)
SODIUM SERPL-SCNC: 138 MMOL/L (ref 136–145)
T4 FREE SERPL-MCNC: 0.87 NG/DL (ref 0.61–1.12)
TSH SERPL-ACNC: 5.18 MIU/L (ref 0.44–3.98)
WBC # BLD AUTO: 8.3 X10*3/UL (ref 4.4–11.3)

## 2025-07-24 PROCEDURE — 84443 ASSAY THYROID STIM HORMONE: CPT

## 2025-07-24 PROCEDURE — 99204 OFFICE O/P NEW MOD 45 MIN: CPT | Performed by: NURSE PRACTITIONER

## 2025-07-24 PROCEDURE — 93005 ELECTROCARDIOGRAM TRACING: CPT

## 2025-07-24 PROCEDURE — 36415 COLL VENOUS BLD VENIPUNCTURE: CPT

## 2025-07-24 PROCEDURE — 80048 BASIC METABOLIC PNL TOTAL CA: CPT

## 2025-07-24 PROCEDURE — 85027 COMPLETE CBC AUTOMATED: CPT

## 2025-07-24 PROCEDURE — 93010 ELECTROCARDIOGRAM REPORT: CPT | Performed by: INTERNAL MEDICINE

## 2025-07-24 PROCEDURE — 84439 ASSAY OF FREE THYROXINE: CPT

## 2025-07-24 RX ORDER — B-COMPLEX WITH VITAMIN C
1 TABLET ORAL DAILY
COMMUNITY

## 2025-07-24 RX ORDER — CHOLECALCIFEROL (VITAMIN D3) 25 MCG
25 TABLET ORAL DAILY
COMMUNITY

## 2025-07-24 RX ORDER — BUTYROSPERMUM PARKII(SHEA BUTTER), SIMMONDSIA CHINENSIS (JOJOBA) SEED OIL, ALOE BARBADENSIS LEAF EXTRACT .01; 1; 3.5 G/100G; G/100G; G/100G
250 LIQUID TOPICAL 2 TIMES DAILY
COMMUNITY

## 2025-07-24 RX ORDER — LANOLIN ALCOHOL/MO/W.PET/CERES
500 CREAM (GRAM) TOPICAL DAILY
COMMUNITY

## 2025-07-24 RX ORDER — GLUCOSAMINE/CHONDRO SU A 500-400 MG
1 TABLET ORAL 3 TIMES DAILY
COMMUNITY

## 2025-07-24 RX ORDER — BISMUTH SUBSALICYLATE 262 MG
1 TABLET,CHEWABLE ORAL DAILY
COMMUNITY

## 2025-07-24 ASSESSMENT — DUKE ACTIVITY SCORE INDEX (DASI)
CAN YOU CLIMB A FLIGHT OF STAIRS OR WALK UP A HILL: YES
CAN YOU DO HEAVY WORK AROUND THE HOUSE LIKE SCRUBBING FLOORS OR LIFTING AND MOVING HEAVY FURNITURE: NO
CAN YOU RUN A SHORT DISTANCE: NO
TOTAL_SCORE: 24.2
CAN YOU DO MODERATE WORK AROUND THE HOUSE LIKE VACUUMING, SWEEPING FLOORS OR CARRYING GROCERIES: YES
DASI METS SCORE: 5.7
CAN YOU HAVE SEXUAL RELATIONS: YES
CAN YOU DO LIGHT WORK AROUND THE HOUSE LIKE DUSTING OR WASHING DISHES: YES
CAN YOU TAKE CARE OF YOURSELF (EAT, DRESS, BATHE, OR USE TOILET): YES
CAN YOU DO YARD WORK LIKE RAKING LEAVES, WEEDING OR PUSHING A MOWER: NO
CAN YOU WALK A BLOCK OR TWO ON LEVEL GROUND: YES
CAN YOU PARTICIPATE IN STRENOUS SPORTS LIKE SWIMMING, SINGLES TENNIS, FOOTBALL, BASKETBALL, OR SKIING: NO
CAN YOU PARTICIPATE IN MODERATE RECREATIONAL ACTIVITIES LIKE GOLF, BOWLING, DANCING, DOUBLES TENNIS OR THROWING A BASEBALL OR FOOTBALL: NO
CAN YOU WALK INDOORS, SUCH AS AROUND YOUR HOUSE: YES

## 2025-07-24 NOTE — CPM/PAT H&P
"CPM/PAT Evaluation       Name: Blaine Méndez (Blaine Méndez \"Trey\")  /Age: 1960/64 y.o.     In-Person       Chief Complaint:     64  yr old male presents to [facility] for pre-operative evaluation, with c/o knee arthritis   LEFT KNEE ARTHROSCOPY WITH PARTIAL MEDIAL MENISCECTOMY  is Scheduled on 2025  with Dr. Lerner     The patient has the following past medical history:  Obesity, ED,  HTN, hypothyroid, elevated PSA, hemorrhoids    Chief complaint:  He reports c/o left knee pain since 2025--->had sudden onset of pain while walking, and continued the next day ; denies any injury or fall  Pain located medial aspect of left knee  Pain worsens with walking and steps , with swelling---reports he had fluid removed 2025 which improved ROM  Denies knee giving out/locking up   He has tried 30 days of meloxicam, Medrol dose pack x 2 rounds, ice application, and knee sleeve     PCP Dr. Waters, LOV 2025     Denies fever, chills or nausea.   Denies any past issues with anesthesia.        Vitals:    25 1128   BP: 132/82   Pulse: 68   Resp: 18   Temp: 36.3 °C (97.3 °F)   SpO2: 98%          Medical History[1]    Surgical History[2]    Patient Sexual activity questions deferred to the physician.    Family History[3]    Allergies[4]    Prior to Admission medications   Medication Sig Start Date End Date Taking? Authorizing Provider   hydroCHLOROthiazide (HYDRODiuril) 25 mg tablet Take 1 tablet (25 mg) by mouth once daily. 25   Jt Cardenas DO   levothyroxine (Synthroid, Levoxyl) 25 mcg tablet Take 1 tablet (25 mcg) by mouth early in the morning.. Take on an empty stomach at the same time each day, either 30 to 60 minutes prior to breakfast 25  Jt Cardenas DO   meloxicam (Mobic) 15 mg tablet Take 1 tablet (15 mg) by mouth once daily. 25   Dewey Mathew MD   pantoprazole (ProtoNix) 40 mg EC tablet Take 1 tablet (40 mg) by mouth once daily. 23   Historical Provider, MD "   psyllium (Metamucil) 3.4 gram packet Take 1 Dose by mouth in the morning and 1 Dose before bedtime.    Historical Provider, MD   sildenafil (Viagra) 100 mg tablet Take 1 tablet (100 mg) by mouth if needed for erectile dysfunction. 3/25/25 4/24/25  Abbie Gupta, APRN-CNP   valsartan (Diovan) 320 mg tablet Take 1 tablet (320 mg) by mouth once daily. 2/21/25   Jt Cardenas DO          Review of Systems  Constitutional: NO F, chills, or sweats  Eyes: no blurred vision or visual disturbance  ENT: denies congestion, sore throat, difficulty hearing  Cardiovascular: no chest pain, no edema, no palps and no syncope.   Respiratory: asthma with a bad cold, no cough,no s.o.b. and no wheezing  Gastrointestinal: no abdominal pain, no N/V, no blood in stools  Genitourinary: PSA elevated, MRI done, nocturia, no dysuria, no urinary frequency, no urinary hesitancy and no feelings of urinary urgency.   Musculoskeletal: see HPI, no arthralgias,  no back pain and no myalgias.   Integumentary: no new skin lesions and no rashes.   Neurological: + difficulty walking/ using cane since June,  no headache, no limb weakness, no numbness and no tingling.   Endocrine: no recent weight gain and no recent weight loss.   Hematologic/Lymphatic: no tendency for easy bruising and no swollen glands.      Physical Exam  Constitutional:       Appearance: Normal appearance. He is obese.     Cardiovascular:      Rate and Rhythm: Normal rate.      Heart sounds: Normal heart sounds.   Pulmonary:      Effort: Pulmonary effort is normal.      Breath sounds: Normal breath sounds.   Abdominal:      General: Bowel sounds are normal.      Palpations: Abdomen is soft.     Musculoskeletal:      Cervical back: Normal range of motion.      Left knee: Swelling present. Decreased range of motion. Tenderness present.      Right lower leg: No edema.      Left lower leg: No edema.      Comments: + antalgic gait, cane in use     Skin:     General: Skin is warm and dry.      Neurological:      Mental Status: He is alert and oriented to person, place, and time.          PAT AIRWAY:   Airway:     Mallampati::  II    TM distance::  <3 FB    Neck ROM::  Full  normal        Visit Vitals  /82   Pulse 68   Temp 36.3 °C (97.3 °F) (Temporal)   Resp 18   Ht 1.829 m (6')   Wt 138 kg (303 lb 9.2 oz)   SpO2 98%   BMI 41.17 kg/m²   Smoking Status Never   BSA 2.65 m²       DASI Risk Score      Flowsheet Row Pre-Admission Testing from 7/24/2025 in [facility] Questionnaire Series Submission from 7/2/2025 in [facility] OR with Generic Provider Jonas   Can you take care of yourself (eat, dress, bathe, or use toilet)?  2.75 filed at 07/24/2025 1052 2.75  filed at 07/02/2025 0928   Can you walk indoors, such as around your house? 1.75 filed at 07/24/2025 1052 1.75  filed at 07/02/2025 0928   Can you walk a block or two on level ground?  2.75 filed at 07/24/2025 1052 2.75  filed at 07/02/2025 0928   Can you climb a flight of stairs or walk up a hill? 5.5 filed at 07/24/2025 1052 5.5  filed at 07/02/2025 0928   Can you run a short distance? 0 filed at 07/24/2025 1052 0  filed at 07/02/2025 0928   Can you do light work around the house like dusting or washing dishes? 2.7 filed at 07/24/2025 1052 2.7  filed at 07/02/2025 0928   Can you do moderate work around the house like vacuuming, sweeping floors or carrying groceries? 3.5 filed at 07/24/2025 1052 3.5  filed at 07/02/2025 0928   Can you do heavy work around the house like scrubbing floors or lifting and moving heavy furniture?  0 filed at 07/24/2025 1052 0  filed at 07/02/2025 0928   Can you do yard work like raking leaves, weeding or pushing a mower? 0 filed at 07/24/2025 1052 4.5  filed at 07/02/2025 0928   Can you have sexual relations? 5.25 filed at 07/24/2025 1052 5.25  filed at 07/02/2025 0928   Can you participate in moderate recreational activities like golf, bowling, dancing, doubles tennis or throwing a  baseball or football? 0 filed at 07/24/2025 1052 0  filed at 07/02/2025 0928   Can you participate in strenous sports like swimming, singles tennis, football, basketball, or skiing? 0 filed at 07/24/2025 1052 0  filed at 07/02/2025 0928   DASI SCORE 24.2 filed at 07/24/2025 1052 28.7  filed at 07/02/2025 0928   METS Score (Will be calculated only when all the questions are answered) 5.7 filed at 07/24/2025 1052 6.3  filed at 07/02/2025 0928     Caprini DVT Assessment    No data to display  Modified Frailty Index    No data to display  [id number] Stroke Risk Points  Current as of about an hour ago        N/A 0 to 9 Points:      Last Change: N/A          The [id number] risk score (Lip CHUCK, et al. 2009. © 2010 [employer]) quantifies the risk of stroke for a patient with atrial fibrillation. For patients without atrial fibrillation or under the age of 18 this score appears as N/A. Higher score values generally indicate higher risk of stroke.        This score is not applicable to this patient. Components are not calculated.          Revised Cardiac Risk Index    No data to display  Apfel Simplified Score    No data to display  Risk Analysis Index Results This Encounter    No data found in the last 10 encounters.       Stop Bang Score      Flowsheet Row Pre-Admission Testing from 7/24/2025 in [facility] Questionnaire Series Submission from 7/2/2025 in [facility] OR with Generic Provider Jonas   Do you snore loudly? 1 filed at 07/24/2025 1052 0  filed at 07/02/2025 0928   Do you often feel tired or fatigued after your sleep? 0 filed at 07/24/2025 1052 0  filed at 07/02/2025 0928   Has anyone ever observed you stop breathing in your sleep? 0 filed at 07/24/2025 1052 0  filed at 07/02/2025 0928   Do you have or are you being treated for high blood pressure? 1 filed at 07/24/2025 1052 1  filed at 07/02/2025 0928   Recent BMI (Calculated) 40.7 filed at 07/24/2025 1058  40.7  filed at 07/02/2025 0928   Is BMI greater than 35 kg/m2? 1=Yes filed at 07/24/2025 1052 1=Yes  filed at 07/02/2025 0928   Age older than 50 years old? 1=Yes filed at 07/24/2025 1052 1=Yes  filed at 07/02/2025 0928   Is your neck circumference greater than 17 inches (Male) or 16 inches (Female)? 0 filed at 07/24/2025 1052 --   Gender - Male 1=Yes filed at 07/24/2025 1052 1=Yes  filed at 07/02/2025 0928   STOP-BANG Total Score 5 filed at 07/24/2025 1052 --     Prodigy: High Risk  Total Score: 16              Prodigy Age Score      Prodigy Gender Score          ARISCAT Score for Postoperative Pulmonary Complications    No data to display  [place] Perioperative Risk for Myocardial Infarction or Cardiac Arrest (MERNA)    No data to display      ASSESSMENT  Obesity   BMI 41.17    ED/ elevated PSA  Takes Sildenafil  Follows urology  Aware to hold x 3 days    HTN  Takes hydrochlorothiazide, valsartan  ECG 7/24/2025 NSR with sinus arrhythmia, 67 bpm    hypothyroid  Takes levothyroxine  TSH 7/24/2025  5.18, free thyroxine pending  Previous TSH 12/23/2024- 8.32    Left knee arthritis  Plan for knee arthroscopy / partial meniscectomy as scheduled       ANESTHESIA FINDINGS:  Intubation History: No history of difficult intubation  Significant Anesthesia Considerations:      Airway History: No abnormal airway history  Predictors of Difficult Airway Management  ? STOP BANG- 5  ? Obesity    CONSULTS:    Patient does not require consults for optimization at this time.     The Following Tests/Procedures Have Been Initiated and Reviewed/ interpreted by me:   CBC, BMP, TSH,  ECG     Planned Anesthetic: general     Instructions Given to Patient:  *Reviewed Medications to be taken in AM of surgery or held  Patient given verbal and written preop instructions and voices comprehension and compliance.     No further testing required.      PLAN  This patient is optimally prepared for surgery, pending free thyroxine results           [1]    Past Medical History:  Diagnosis Date    Arthritis     Asthma     Colon polyp     GERD (gastroesophageal reflux disease) 2029    Under controll    Hernia, internal 6/2019    Repaired via surgery 9/2019    Hypertension 2018    Joint pain April 2025    Left knee    Left lower quadrant pain 07/13/2019    Left groin pain    Obesity     Personal history of other diseases of the circulatory system 01/03/2020    History of hypertension    Personal history of other diseases of the digestive system 04/08/2019    History of diverticulitis of colon    Personal history of other diseases of the musculoskeletal system and connective tissue 04/08/2019    History of backache    Personal history of other diseases of the respiratory system 03/21/2018    History of acute bronchitis    Personal history of other endocrine, nutritional and metabolic disease 01/03/2020    History of morbid obesity    Personal history of other specified conditions     History of abdominal pain    Vision loss    [2]   Past Surgical History:  Procedure Laterality Date    OTHER SURGICAL HISTORY  01/03/2020    Colonoscopy   [3]   Family History  Problem Relation Name Age of Onset    Other (thyroid) Mother      Other (cardiac disorder) Father Blaine Méndez     Hyperlipidemia Father Blaine Méndez     Hypertension Father Blaine Méndez     Lung cancer Paternal Grandmother      Lung cancer Paternal Grandfather      Prostate cancer Mother's Brother Severo Izaguirre     Prostate cancer Father's Brother Trenton Méndez    [4] No Known Allergies

## 2025-07-24 NOTE — H&P (VIEW-ONLY)
"CPM/PAT Evaluation       Name: Blaine Méndez (Blaine Méndez \"Trey\")  /Age: 1960/64 y.o.     In-Person       Chief Complaint:     64  yr old male presents to [facility] for pre-operative evaluation, with c/o knee arthritis   LEFT KNEE ARTHROSCOPY WITH PARTIAL MEDIAL MENISCECTOMY  is Scheduled on 2025  with Dr. Lerner     The patient has the following past medical history:  Obesity, ED,  HTN, hypothyroid, elevated PSA, hemorrhoids    Chief complaint:  He reports c/o left knee pain since 2025--->had sudden onset of pain while walking, and continued the next day ; denies any injury or fall  Pain located medial aspect of left knee  Pain worsens with walking and steps , with swelling---reports he had fluid removed 2025 which improved ROM  Denies knee giving out/locking up   He has tried 30 days of meloxicam, Medrol dose pack x 2 rounds, ice application, and knee sleeve     PCP Dr. Waters, LOV 2025     Denies fever, chills or nausea.   Denies any past issues with anesthesia.        Vitals:    25 1128   BP: 132/82   Pulse: 68   Resp: 18   Temp: 36.3 °C (97.3 °F)   SpO2: 98%          Medical History[1]    Surgical History[2]    Patient Sexual activity questions deferred to the physician.    Family History[3]    Allergies[4]    Prior to Admission medications   Medication Sig Start Date End Date Taking? Authorizing Provider   hydroCHLOROthiazide (HYDRODiuril) 25 mg tablet Take 1 tablet (25 mg) by mouth once daily. 25   Jt Cardenas DO   levothyroxine (Synthroid, Levoxyl) 25 mcg tablet Take 1 tablet (25 mcg) by mouth early in the morning.. Take on an empty stomach at the same time each day, either 30 to 60 minutes prior to breakfast 25  Jt Cardenas DO   meloxicam (Mobic) 15 mg tablet Take 1 tablet (15 mg) by mouth once daily. 25   Dewey Mathew MD   pantoprazole (ProtoNix) 40 mg EC tablet Take 1 tablet (40 mg) by mouth once daily. 23   Historical Provider, MD "   psyllium (Metamucil) 3.4 gram packet Take 1 Dose by mouth in the morning and 1 Dose before bedtime.    Historical Provider, MD   sildenafil (Viagra) 100 mg tablet Take 1 tablet (100 mg) by mouth if needed for erectile dysfunction. 3/25/25 4/24/25  Abbie Gupta, APRN-CNP   valsartan (Diovan) 320 mg tablet Take 1 tablet (320 mg) by mouth once daily. 2/21/25   Jt Cardenas DO          Review of Systems  Constitutional: NO F, chills, or sweats  Eyes: no blurred vision or visual disturbance  ENT: denies congestion, sore throat, difficulty hearing  Cardiovascular: no chest pain, no edema, no palps and no syncope.   Respiratory: asthma with a bad cold, no cough,no s.o.b. and no wheezing  Gastrointestinal: no abdominal pain, no N/V, no blood in stools  Genitourinary: PSA elevated, MRI done, nocturia, no dysuria, no urinary frequency, no urinary hesitancy and no feelings of urinary urgency.   Musculoskeletal: see HPI, no arthralgias,  no back pain and no myalgias.   Integumentary: no new skin lesions and no rashes.   Neurological: + difficulty walking/ using cane since June,  no headache, no limb weakness, no numbness and no tingling.   Endocrine: no recent weight gain and no recent weight loss.   Hematologic/Lymphatic: no tendency for easy bruising and no swollen glands.      Physical Exam  Constitutional:       Appearance: Normal appearance. He is obese.     Cardiovascular:      Rate and Rhythm: Normal rate.      Heart sounds: Normal heart sounds.   Pulmonary:      Effort: Pulmonary effort is normal.      Breath sounds: Normal breath sounds.   Abdominal:      General: Bowel sounds are normal.      Palpations: Abdomen is soft.     Musculoskeletal:      Cervical back: Normal range of motion.      Left knee: Swelling present. Decreased range of motion. Tenderness present.      Right lower leg: No edema.      Left lower leg: No edema.      Comments: + antalgic gait, cane in use     Skin:     General: Skin is warm and dry.      Neurological:      Mental Status: He is alert and oriented to person, place, and time.          PAT AIRWAY:   Airway:     Mallampati::  II    TM distance::  <3 FB    Neck ROM::  Full  normal        Visit Vitals  /82   Pulse 68   Temp 36.3 °C (97.3 °F) (Temporal)   Resp 18   Ht 1.829 m (6')   Wt 138 kg (303 lb 9.2 oz)   SpO2 98%   BMI 41.17 kg/m²   Smoking Status Never   BSA 2.65 m²       DASI Risk Score      Flowsheet Row Pre-Admission Testing from 7/24/2025 in [facility] Questionnaire Series Submission from 7/2/2025 in [facility] OR with Generic Provider Jonas   Can you take care of yourself (eat, dress, bathe, or use toilet)?  2.75 filed at 07/24/2025 1052 2.75  filed at 07/02/2025 0928   Can you walk indoors, such as around your house? 1.75 filed at 07/24/2025 1052 1.75  filed at 07/02/2025 0928   Can you walk a block or two on level ground?  2.75 filed at 07/24/2025 1052 2.75  filed at 07/02/2025 0928   Can you climb a flight of stairs or walk up a hill? 5.5 filed at 07/24/2025 1052 5.5  filed at 07/02/2025 0928   Can you run a short distance? 0 filed at 07/24/2025 1052 0  filed at 07/02/2025 0928   Can you do light work around the house like dusting or washing dishes? 2.7 filed at 07/24/2025 1052 2.7  filed at 07/02/2025 0928   Can you do moderate work around the house like vacuuming, sweeping floors or carrying groceries? 3.5 filed at 07/24/2025 1052 3.5  filed at 07/02/2025 0928   Can you do heavy work around the house like scrubbing floors or lifting and moving heavy furniture?  0 filed at 07/24/2025 1052 0  filed at 07/02/2025 0928   Can you do yard work like raking leaves, weeding or pushing a mower? 0 filed at 07/24/2025 1052 4.5  filed at 07/02/2025 0928   Can you have sexual relations? 5.25 filed at 07/24/2025 1052 5.25  filed at 07/02/2025 0928   Can you participate in moderate recreational activities like golf, bowling, dancing, doubles tennis or throwing a  baseball or football? 0 filed at 07/24/2025 1052 0  filed at 07/02/2025 0928   Can you participate in strenous sports like swimming, singles tennis, football, basketball, or skiing? 0 filed at 07/24/2025 1052 0  filed at 07/02/2025 0928   DASI SCORE 24.2 filed at 07/24/2025 1052 28.7  filed at 07/02/2025 0928   METS Score (Will be calculated only when all the questions are answered) 5.7 filed at 07/24/2025 1052 6.3  filed at 07/02/2025 0928     Caprini DVT Assessment    No data to display  Modified Frailty Index    No data to display  [id number] Stroke Risk Points  Current as of about an hour ago        N/A 0 to 9 Points:      Last Change: N/A          The [id number] risk score (Lip CHUCK, et al. 2009. © 2010 [employer]) quantifies the risk of stroke for a patient with atrial fibrillation. For patients without atrial fibrillation or under the age of 18 this score appears as N/A. Higher score values generally indicate higher risk of stroke.        This score is not applicable to this patient. Components are not calculated.          Revised Cardiac Risk Index    No data to display  Apfel Simplified Score    No data to display  Risk Analysis Index Results This Encounter    No data found in the last 10 encounters.       Stop Bang Score      Flowsheet Row Pre-Admission Testing from 7/24/2025 in [facility] Questionnaire Series Submission from 7/2/2025 in [facility] OR with Generic Provider Jonas   Do you snore loudly? 1 filed at 07/24/2025 1052 0  filed at 07/02/2025 0928   Do you often feel tired or fatigued after your sleep? 0 filed at 07/24/2025 1052 0  filed at 07/02/2025 0928   Has anyone ever observed you stop breathing in your sleep? 0 filed at 07/24/2025 1052 0  filed at 07/02/2025 0928   Do you have or are you being treated for high blood pressure? 1 filed at 07/24/2025 1052 1  filed at 07/02/2025 0928   Recent BMI (Calculated) 40.7 filed at 07/24/2025 1056  40.7  filed at 07/02/2025 0928   Is BMI greater than 35 kg/m2? 1=Yes filed at 07/24/2025 1052 1=Yes  filed at 07/02/2025 0928   Age older than 50 years old? 1=Yes filed at 07/24/2025 1052 1=Yes  filed at 07/02/2025 0928   Is your neck circumference greater than 17 inches (Male) or 16 inches (Female)? 0 filed at 07/24/2025 1052 --   Gender - Male 1=Yes filed at 07/24/2025 1052 1=Yes  filed at 07/02/2025 0928   STOP-BANG Total Score 5 filed at 07/24/2025 1052 --     Prodigy: High Risk  Total Score: 16              Prodigy Age Score      Prodigy Gender Score          ARISCAT Score for Postoperative Pulmonary Complications    No data to display  [place] Perioperative Risk for Myocardial Infarction or Cardiac Arrest (MERNA)    No data to display      ASSESSMENT  Obesity   BMI 41.17    ED/ elevated PSA  Takes Sildenafil  Follows urology  Aware to hold x 3 days    HTN  Takes hydrochlorothiazide, valsartan  ECG 7/24/2025 NSR with sinus arrhythmia, 67 bpm    hypothyroid  Takes levothyroxine  TSH 7/24/2025  5.18, free thyroxine pending  Previous TSH 12/23/2024- 8.32    Left knee arthritis  Plan for knee arthroscopy / partial meniscectomy as scheduled       ANESTHESIA FINDINGS:  Intubation History: No history of difficult intubation  Significant Anesthesia Considerations:      Airway History: No abnormal airway history  Predictors of Difficult Airway Management  ? STOP BANG- 5  ? Obesity    CONSULTS:    Patient does not require consults for optimization at this time.     The Following Tests/Procedures Have Been Initiated and Reviewed/ interpreted by me:   CBC, BMP, TSH,  ECG     Planned Anesthetic: general     Instructions Given to Patient:  *Reviewed Medications to be taken in AM of surgery or held  Patient given verbal and written preop instructions and voices comprehension and compliance.     No further testing required.      PLAN  This patient is optimally prepared for surgery, pending free thyroxine results           [1]    Past Medical History:  Diagnosis Date    Arthritis     Asthma     Colon polyp     GERD (gastroesophageal reflux disease) 2029    Under controll    Hernia, internal 6/2019    Repaired via surgery 9/2019    Hypertension 2018    Joint pain April 2025    Left knee    Left lower quadrant pain 07/13/2019    Left groin pain    Obesity     Personal history of other diseases of the circulatory system 01/03/2020    History of hypertension    Personal history of other diseases of the digestive system 04/08/2019    History of diverticulitis of colon    Personal history of other diseases of the musculoskeletal system and connective tissue 04/08/2019    History of backache    Personal history of other diseases of the respiratory system 03/21/2018    History of acute bronchitis    Personal history of other endocrine, nutritional and metabolic disease 01/03/2020    History of morbid obesity    Personal history of other specified conditions     History of abdominal pain    Vision loss    [2]   Past Surgical History:  Procedure Laterality Date    OTHER SURGICAL HISTORY  01/03/2020    Colonoscopy   [3]   Family History  Problem Relation Name Age of Onset    Other (thyroid) Mother      Other (cardiac disorder) Father Blaine Méndez     Hyperlipidemia Father Blaine Méndez     Hypertension Father Blaine Méndez     Lung cancer Paternal Grandmother      Lung cancer Paternal Grandfather      Prostate cancer Mother's Brother Severo Izaguirre     Prostate cancer Father's Brother Trenton Méndez    [4] No Known Allergies

## 2025-07-24 NOTE — PREPROCEDURE INSTRUCTIONS
Medication List            Accurate as of July 24, 2025 11:54 AM. Always use your most recent med list.                ascorbic acid 500 mg ER capsule  Commonly known as: Vitamin C  Additional Medication Adjustments for Surgery: Take last dose 7 days before surgery  Notes to patient: Stop now     B complex-vitamin C tablet  Additional Medication Adjustments for Surgery: Take last dose 7 days before surgery  Notes to patient: Stop now     glucosamine-chondroitin 500-400 mg tablet  Additional Medication Adjustments for Surgery: Take last dose 7 days before surgery  Notes to patient: Stop now     hydroCHLOROthiazide 25 mg tablet  Commonly known as: HYDRODiuril  Take 1 tablet (25 mg) by mouth once daily.  Medication Adjustments for Surgery: Take on the morning of surgery     levothyroxine 25 mcg tablet  Commonly known as: Synthroid, Levoxyl  Take 1 tablet (25 mcg) by mouth early in the morning.. Take on an empty stomach at the same time each day, either 30 to 60 minutes prior to breakfast  Medication Adjustments for Surgery: Take on the morning of surgery     meloxicam 15 mg tablet  Commonly known as: Mobic  Take 1 tablet (15 mg) by mouth once daily.  Additional Medication Adjustments for Surgery: Take last dose 7 days before surgery  Notes to patient: Stop now     multivitamin tablet  Additional Medication Adjustments for Surgery: Take last dose 7 days before surgery  Notes to patient: Stop now     pantoprazole 40 mg EC tablet  Commonly known as: ProtoNix  Medication Adjustments for Surgery: Take on the morning of surgery     psyllium 3.4 gram packet  Commonly known as: Metamucil  Medication Adjustments for Surgery: Do Not take on the morning of surgery     saccharomyces boulardii 250 mg capsule  Commonly known as: Florastor  Additional Medication Adjustments for Surgery: Take last dose 7 days before surgery  Notes to patient: Stop now     sildenafil 100 mg tablet  Commonly known as: Viagra  Take 1 tablet (100 mg)  by mouth if needed for erectile dysfunction.  Medication Adjustments for Surgery: Take last dose 3 days before surgery  Notes to patient: Last day to take is 7/26 and then hold until after surgery     valsartan 320 mg tablet  Commonly known as: Diovan  Take 1 tablet (320 mg) by mouth once daily.  Medication Adjustments for Surgery: Take last dose 1 day (24 hours) before surgery  Notes to patient: Hold the night before and the day of surgery= Hold x 24 hours     Vitamin D3 25 mcg (1,000 units) tablet  Generic drug: cholecalciferol  Additional Medication Adjustments for Surgery: Take last dose 7 days before surgery  Notes to patient: Stop now            PRE-OPERATIVE INSTRUCTIONS FOR SURGERY        *Do not eat anything after midnight the night of surgery.  This includes food of any kind (including hard candy, cough drops, mints).       You may have up to 13 ounces of clear liquid until TWO hours prior to your scheduled arrival time  Clear liquids include water, black tea/coffee, (no milk or cream) apple juice and electrolyte drinks (GATORADE).  You may chew gum until TWO hours prior you your surgery/procedure.     PLEASE REVIEW YOUR MEDICATION LIST so as to determine how to take your medicine around your procedure.          -----------    *One of our staff members will call you ONE business day before your surgery, between 11am-2 pm to let you know the time to arrive.    If you have not received a call by 2 pm, call [phone]      *When you arrive at the hospital-->GO TO Registration on the ground floor    *Stop smoking 24 hours prior to surgery.      No Marijuana, CBD Oil or Vaping for 48 hours    *No alcohol 24 hours prior to surgery    *You will need a responsible adult to drive you home    -You may be asked to remove your dentures, partial plate, eyeglasses or contact lenses before going to surgery.  Please bring a case for these items.    -Body piercings need to be removed.  Jewelry and valuables should be  left at home.    Please shower the morning of your surgery so as to remove all body residue from applied hygiene products.  DO NOT REAPPLY any lotions, creams, ointments, cologne or deodorant to your skin once dried off.    -Put on loose,  comfortable, clean clothing, that will accommodate bandages      *If you have any further questions about your pre-op instructions,  not mentioned in this handout, then call [phone]*      What you may be asked to bring to surgery:    ___Photo ID and insurance information        NPO Instructions:      Do not eat any food after midnight the night before your surgery/procedure.  You may have clear liquids until TWO hours before surgery/procedure. This includes water, black tea/coffee, (no milk or cream) apple juice and electrolyte drinks (Gatorade).      Additional Instructions:     Day of Surgery:  Review your medication instructions, take indicated medications  You may have clear liquids until TWO hours before surgery/procedure.  This includes water, black tea/coffee, (no milk or cream) apple juice and electrolyte drinks (Gatorade)  Wear  comfortable loose fitting clothing  Do not use moisturizers, creams, lotions or perfume  All jewelry and valuables should be left at home

## 2025-07-25 LAB
ATRIAL RATE: 67 BPM
P AXIS: 19 DEGREES
P OFFSET: 180 MS
P ONSET: 119 MS
PR INTERVAL: 180 MS
Q ONSET: 209 MS
QRS COUNT: 11 BEATS
QRS DURATION: 94 MS
QT INTERVAL: 424 MS
QTC CALCULATION(BAZETT): 448 MS
QTC FREDERICIA: 440 MS
R AXIS: -52 DEGREES
T AXIS: 18 DEGREES
T OFFSET: 421 MS
VENTRICULAR RATE: 67 BPM

## 2025-07-29 ENCOUNTER — APPOINTMENT (OUTPATIENT)
Dept: ORTHOPEDIC SURGERY | Facility: CLINIC | Age: 65
End: 2025-07-29
Payer: COMMERCIAL

## 2025-07-30 ENCOUNTER — ANESTHESIA EVENT (OUTPATIENT)
Dept: OPERATING ROOM | Facility: HOSPITAL | Age: 65
End: 2025-07-30
Payer: COMMERCIAL

## 2025-07-30 ENCOUNTER — HOSPITAL ENCOUNTER (OUTPATIENT)
Facility: HOSPITAL | Age: 65
Setting detail: OUTPATIENT SURGERY
Discharge: HOME | End: 2025-07-30
Attending: ORTHOPAEDIC SURGERY | Admitting: ORTHOPAEDIC SURGERY
Payer: COMMERCIAL

## 2025-07-30 ENCOUNTER — PHARMACY VISIT (OUTPATIENT)
Dept: PHARMACY | Facility: CLINIC | Age: 65
End: 2025-07-30
Payer: COMMERCIAL

## 2025-07-30 ENCOUNTER — ANESTHESIA (OUTPATIENT)
Dept: OPERATING ROOM | Facility: HOSPITAL | Age: 65
End: 2025-07-30
Payer: COMMERCIAL

## 2025-07-30 VITALS
DIASTOLIC BLOOD PRESSURE: 67 MMHG | HEART RATE: 55 BPM | TEMPERATURE: 96.8 F | OXYGEN SATURATION: 96 % | RESPIRATION RATE: 16 BRPM | SYSTOLIC BLOOD PRESSURE: 108 MMHG

## 2025-07-30 DIAGNOSIS — M17.12 ARTHRITIS OF LEFT KNEE: Primary | ICD-10-CM

## 2025-07-30 PROCEDURE — A29881 PR KNEE SCOPE,MED/LAT MENISECTOMY: Performed by: ANESTHESIOLOGIST ASSISTANT

## 2025-07-30 PROCEDURE — 7100000001 HC RECOVERY ROOM TIME - INITIAL BASE CHARGE: Performed by: ORTHOPAEDIC SURGERY

## 2025-07-30 PROCEDURE — A29881 PR KNEE SCOPE,MED/LAT MENISECTOMY: Performed by: ANESTHESIOLOGY

## 2025-07-30 PROCEDURE — 7100000002 HC RECOVERY ROOM TIME - EACH INCREMENTAL 1 MINUTE: Performed by: ORTHOPAEDIC SURGERY

## 2025-07-30 PROCEDURE — 97161 PT EVAL LOW COMPLEX 20 MIN: CPT | Mod: GP

## 2025-07-30 PROCEDURE — 2500000004 HC RX 250 GENERAL PHARMACY W/ HCPCS (ALT 636 FOR OP/ED): Performed by: ORTHOPAEDIC SURGERY

## 2025-07-30 PROCEDURE — RXMED WILLOW AMBULATORY MEDICATION CHARGE

## 2025-07-30 PROCEDURE — 3600000009 HC OR TIME - EACH INCREMENTAL 1 MINUTE - PROCEDURE LEVEL FOUR: Performed by: ORTHOPAEDIC SURGERY

## 2025-07-30 PROCEDURE — 2720000007 HC OR 272 NO HCPCS: Performed by: ORTHOPAEDIC SURGERY

## 2025-07-30 PROCEDURE — 7100000010 HC PHASE TWO TIME - EACH INCREMENTAL 1 MINUTE: Performed by: ORTHOPAEDIC SURGERY

## 2025-07-30 PROCEDURE — 7100000009 HC PHASE TWO TIME - INITIAL BASE CHARGE: Performed by: ORTHOPAEDIC SURGERY

## 2025-07-30 PROCEDURE — 2500000005 HC RX 250 GENERAL PHARMACY W/O HCPCS: Performed by: ORTHOPAEDIC SURGERY

## 2025-07-30 PROCEDURE — 3600000004 HC OR TIME - INITIAL BASE CHARGE - PROCEDURE LEVEL FOUR: Performed by: ORTHOPAEDIC SURGERY

## 2025-07-30 PROCEDURE — 2500000004 HC RX 250 GENERAL PHARMACY W/ HCPCS (ALT 636 FOR OP/ED): Performed by: ANESTHESIOLOGIST ASSISTANT

## 2025-07-30 PROCEDURE — 3700000001 HC GENERAL ANESTHESIA TIME - INITIAL BASE CHARGE: Performed by: ORTHOPAEDIC SURGERY

## 2025-07-30 PROCEDURE — 29881 ARTHRS KNE SRG MNISECTMY M/L: CPT | Performed by: ORTHOPAEDIC SURGERY

## 2025-07-30 PROCEDURE — 3700000002 HC GENERAL ANESTHESIA TIME - EACH INCREMENTAL 1 MINUTE: Performed by: ORTHOPAEDIC SURGERY

## 2025-07-30 RX ORDER — ALBUTEROL SULFATE 0.83 MG/ML
2.5 SOLUTION RESPIRATORY (INHALATION) ONCE AS NEEDED
Status: DISCONTINUED | OUTPATIENT
Start: 2025-07-30 | End: 2025-07-30 | Stop reason: HOSPADM

## 2025-07-30 RX ORDER — SODIUM CHLORIDE, SODIUM LACTATE, POTASSIUM CHLORIDE, CALCIUM CHLORIDE 600; 310; 30; 20 MG/100ML; MG/100ML; MG/100ML; MG/100ML
INJECTION, SOLUTION INTRAVENOUS CONTINUOUS PRN
Status: DISCONTINUED | OUTPATIENT
Start: 2025-07-30 | End: 2025-07-30

## 2025-07-30 RX ORDER — PROPOFOL 10 MG/ML
INJECTION, EMULSION INTRAVENOUS AS NEEDED
Status: DISCONTINUED | OUTPATIENT
Start: 2025-07-30 | End: 2025-07-30

## 2025-07-30 RX ORDER — ROCURONIUM BROMIDE 10 MG/ML
INJECTION, SOLUTION INTRAVENOUS AS NEEDED
Status: DISCONTINUED | OUTPATIENT
Start: 2025-07-30 | End: 2025-07-30

## 2025-07-30 RX ORDER — LABETALOL HYDROCHLORIDE 5 MG/ML
5 INJECTION, SOLUTION INTRAVENOUS ONCE AS NEEDED
Status: DISCONTINUED | OUTPATIENT
Start: 2025-07-30 | End: 2025-07-30 | Stop reason: HOSPADM

## 2025-07-30 RX ORDER — OXYCODONE HYDROCHLORIDE 5 MG/1
5 TABLET ORAL EVERY 6 HOURS PRN
Qty: 5 TABLET | Refills: 0 | Status: SHIPPED | OUTPATIENT
Start: 2025-07-30 | End: 2025-08-06

## 2025-07-30 RX ORDER — LIDOCAINE HYDROCHLORIDE 10 MG/ML
0.1 INJECTION, SOLUTION INFILTRATION; PERINEURAL ONCE
Status: DISCONTINUED | OUTPATIENT
Start: 2025-07-30 | End: 2025-07-30 | Stop reason: HOSPADM

## 2025-07-30 RX ORDER — ACETAMINOPHEN 500 MG
1000 TABLET ORAL EVERY 6 HOURS PRN
Qty: 60 TABLET | Refills: 0 | Status: SHIPPED | OUTPATIENT
Start: 2025-07-30 | End: 2025-08-09

## 2025-07-30 RX ORDER — MELOXICAM 15 MG/1
15 TABLET ORAL DAILY
Qty: 30 TABLET | Refills: 1 | Status: SHIPPED | OUTPATIENT
Start: 2025-07-30

## 2025-07-30 RX ORDER — MORPHINE SULFATE 0.5 MG/ML
INJECTION, SOLUTION EPIDURAL; INTRATHECAL; INTRAVENOUS AS NEEDED
Status: DISCONTINUED | OUTPATIENT
Start: 2025-07-30 | End: 2025-07-30 | Stop reason: HOSPADM

## 2025-07-30 RX ORDER — BUPIVACAINE HCL/EPINEPHRINE 0.25-.0005
VIAL (ML) INJECTION AS NEEDED
Status: DISCONTINUED | OUTPATIENT
Start: 2025-07-30 | End: 2025-07-30 | Stop reason: HOSPADM

## 2025-07-30 RX ORDER — MIDAZOLAM HYDROCHLORIDE 1 MG/ML
INJECTION, SOLUTION INTRAMUSCULAR; INTRAVENOUS AS NEEDED
Status: DISCONTINUED | OUTPATIENT
Start: 2025-07-30 | End: 2025-07-30

## 2025-07-30 RX ORDER — ONDANSETRON HYDROCHLORIDE 2 MG/ML
INJECTION, SOLUTION INTRAVENOUS AS NEEDED
Status: DISCONTINUED | OUTPATIENT
Start: 2025-07-30 | End: 2025-07-30

## 2025-07-30 RX ORDER — MIDAZOLAM HYDROCHLORIDE 1 MG/ML
1 INJECTION, SOLUTION INTRAMUSCULAR; INTRAVENOUS ONCE AS NEEDED
Status: DISCONTINUED | OUTPATIENT
Start: 2025-07-30 | End: 2025-07-30 | Stop reason: HOSPADM

## 2025-07-30 RX ORDER — HYDRALAZINE HYDROCHLORIDE 20 MG/ML
5 INJECTION INTRAMUSCULAR; INTRAVENOUS EVERY 30 MIN PRN
Status: DISCONTINUED | OUTPATIENT
Start: 2025-07-30 | End: 2025-07-30 | Stop reason: HOSPADM

## 2025-07-30 RX ORDER — ACETAMINOPHEN 325 MG/1
650 TABLET ORAL EVERY 4 HOURS PRN
Status: DISCONTINUED | OUTPATIENT
Start: 2025-07-30 | End: 2025-07-30 | Stop reason: HOSPADM

## 2025-07-30 RX ORDER — FENTANYL CITRATE 50 UG/ML
INJECTION, SOLUTION INTRAMUSCULAR; INTRAVENOUS AS NEEDED
Status: DISCONTINUED | OUTPATIENT
Start: 2025-07-30 | End: 2025-07-30

## 2025-07-30 RX ORDER — DEXMEDETOMIDINE HYDROCHLORIDE 100 UG/ML
INJECTION, SOLUTION INTRAVENOUS AS NEEDED
Status: DISCONTINUED | OUTPATIENT
Start: 2025-07-30 | End: 2025-07-30

## 2025-07-30 RX ORDER — SODIUM CHLORIDE, SODIUM LACTATE, POTASSIUM CHLORIDE, CALCIUM CHLORIDE 600; 310; 30; 20 MG/100ML; MG/100ML; MG/100ML; MG/100ML
100 INJECTION, SOLUTION INTRAVENOUS CONTINUOUS
Status: DISCONTINUED | OUTPATIENT
Start: 2025-07-30 | End: 2025-07-30 | Stop reason: HOSPADM

## 2025-07-30 RX ORDER — PHENYLEPHRINE HCL IN 0.9% NACL 1 MG/10 ML
SYRINGE (ML) INTRAVENOUS AS NEEDED
Status: DISCONTINUED | OUTPATIENT
Start: 2025-07-30 | End: 2025-07-30

## 2025-07-30 RX ORDER — ONDANSETRON HYDROCHLORIDE 2 MG/ML
4 INJECTION, SOLUTION INTRAVENOUS ONCE AS NEEDED
Status: DISCONTINUED | OUTPATIENT
Start: 2025-07-30 | End: 2025-07-30 | Stop reason: HOSPADM

## 2025-07-30 RX ORDER — BUPIVACAINE HYDROCHLORIDE 2.5 MG/ML
INJECTION, SOLUTION INFILTRATION; PERINEURAL AS NEEDED
Status: DISCONTINUED | OUTPATIENT
Start: 2025-07-30 | End: 2025-07-30 | Stop reason: HOSPADM

## 2025-07-30 RX ORDER — LIDOCAINE HCL/PF 100 MG/5ML
SYRINGE (ML) INTRAVENOUS AS NEEDED
Status: DISCONTINUED | OUTPATIENT
Start: 2025-07-30 | End: 2025-07-30

## 2025-07-30 RX ADMIN — ROCURONIUM BROMIDE 20 MG: 10 INJECTION, SOLUTION INTRAVENOUS at 10:22

## 2025-07-30 RX ADMIN — CEFAZOLIN 3 G: 3 INJECTION, POWDER, FOR SOLUTION INTRAVENOUS at 10:13

## 2025-07-30 RX ADMIN — ROCURONIUM BROMIDE 50 MG: 10 INJECTION, SOLUTION INTRAVENOUS at 10:03

## 2025-07-30 RX ADMIN — SUGAMMADEX 280 MG: 100 INJECTION, SOLUTION INTRAVENOUS at 10:48

## 2025-07-30 RX ADMIN — DEXMEDETOMIDINE HYDROCHLORIDE 12 MCG: 100 INJECTION, SOLUTION INTRAVENOUS at 10:31

## 2025-07-30 RX ADMIN — DEXAMETHASONE SODIUM PHOSPHATE 4 MG: 4 INJECTION, SOLUTION INTRAMUSCULAR; INTRAVENOUS at 10:10

## 2025-07-30 RX ADMIN — FENTANYL CITRATE 100 MCG: 50 INJECTION, SOLUTION INTRAMUSCULAR; INTRAVENOUS at 10:03

## 2025-07-30 RX ADMIN — DEXMEDETOMIDINE HYDROCHLORIDE 8 MCG: 100 INJECTION, SOLUTION INTRAVENOUS at 10:47

## 2025-07-30 RX ADMIN — Medication 100 MCG: at 10:13

## 2025-07-30 RX ADMIN — Medication 100 MCG: at 10:18

## 2025-07-30 RX ADMIN — POVIDONE-IODINE 1 APPLICATION: 5 SOLUTION TOPICAL at 09:09

## 2025-07-30 RX ADMIN — SODIUM CHLORIDE, POTASSIUM CHLORIDE, SODIUM LACTATE AND CALCIUM CHLORIDE: 600; 310; 30; 20 INJECTION, SOLUTION INTRAVENOUS at 09:58

## 2025-07-30 RX ADMIN — LIDOCAINE HYDROCHLORIDE 100 MG: 20 INJECTION INTRAVENOUS at 10:03

## 2025-07-30 RX ADMIN — MIDAZOLAM 2 MG: 1 INJECTION INTRAMUSCULAR; INTRAVENOUS at 09:58

## 2025-07-30 RX ADMIN — DEXMEDETOMIDINE HYDROCHLORIDE 8 MCG: 100 INJECTION, SOLUTION INTRAVENOUS at 10:35

## 2025-07-30 RX ADMIN — ONDANSETRON 4 MG: 2 INJECTION INTRAMUSCULAR; INTRAVENOUS at 10:47

## 2025-07-30 RX ADMIN — Medication 100 MCG: at 10:38

## 2025-07-30 RX ADMIN — PROPOFOL 200 MG: 10 INJECTION, EMULSION INTRAVENOUS at 10:03

## 2025-07-30 RX ADMIN — FENTANYL CITRATE 50 MCG: 50 INJECTION, SOLUTION INTRAMUSCULAR; INTRAVENOUS at 10:25

## 2025-07-30 SDOH — HEALTH STABILITY: MENTAL HEALTH: CURRENT SMOKER: 0

## 2025-07-30 ASSESSMENT — PAIN SCALES - GENERAL
PAINLEVEL_OUTOF10: 0 - NO PAIN
PAIN_LEVEL: 0
PAINLEVEL_OUTOF10: 0 - NO PAIN

## 2025-07-30 ASSESSMENT — COLUMBIA-SUICIDE SEVERITY RATING SCALE - C-SSRS
1. IN THE PAST MONTH, HAVE YOU WISHED YOU WERE DEAD OR WISHED YOU COULD GO TO SLEEP AND NOT WAKE UP?: NO
2. HAVE YOU ACTUALLY HAD ANY THOUGHTS OF KILLING YOURSELF?: NO
6. HAVE YOU EVER DONE ANYTHING, STARTED TO DO ANYTHING, OR PREPARED TO DO ANYTHING TO END YOUR LIFE?: NO

## 2025-07-30 ASSESSMENT — COGNITIVE AND FUNCTIONAL STATUS - GENERAL: MOBILITY SCORE: 24

## 2025-07-30 ASSESSMENT — PAIN - FUNCTIONAL ASSESSMENT
PAIN_FUNCTIONAL_ASSESSMENT: 0-10

## 2025-07-30 ASSESSMENT — ACTIVITIES OF DAILY LIVING (ADL): ADL_ASSISTANCE: INDEPENDENT

## 2025-07-30 NOTE — ANESTHESIA POSTPROCEDURE EVALUATION
"Patient: Blaine Méndez \"Trey\"    Procedure Summary       Date: 07/30/25 Room / Location: [facility] OR 06 / [facility]    Anesthesia Start: 0956 Anesthesia Stop: 1108    Procedure: LEFT KNEE ARTHROSCOPY WITH PARTIAL MEDIAL MENISCECTOMY (Left: Knee) Diagnosis:       Arthritis of left knee      (Arthritis of left knee [M17.12])    Surgeons: Guille Lerner MD Responsible Provider: Adilson Alvarez MD    Anesthesia Type: general ASA Status: Not recorded            Anesthesia Type: general    Vitals Value Taken Time   /59 07/30/25 11:06   Temp 36 °C (96.8 °F) 07/30/25 11:06   Pulse 69 07/30/25 11:06   Resp 18 07/30/25 11:06   SpO2 96 % 07/30/25 11:06       Anesthesia Post Evaluation    Patient location during evaluation: PACU  Patient participation: complete - patient participated  Level of consciousness: awake  Pain score: 0  Pain management: adequate  Airway patency: patent  Cardiovascular status: acceptable  Respiratory status: acceptable  Hydration status: acceptable  Postoperative Nausea and Vomiting: none        There were no known notable events for this encounter.    "

## 2025-07-30 NOTE — ANESTHESIA PROCEDURE NOTES
Airway  Date/Time: 7/30/2025 10:06 AM  Reason: elective    Airway not difficult    Staffing  Performed: EDILBERTO   Authorized by: Adilson Alvarez MD    Performed by: EDILBERTO Bojorquez  Patient location during procedure: OR    Patient Condition  Indications for airway management: anesthesia and airway protection  Patient position: sniffing  MILS maintained throughout  Planned trial extubation  Sedation level: deep     Final Airway Details   Preoxygenated: yes  Final airway type: endotracheal airway  Successful airway: ETT   Successful intubation technique: video laryngoscopy  Adjuncts used in placement: intubating stylet  Endotracheal tube insertion site: oral  Blade: Xiang  Blade size: #4  ETT size (mm): 8.0  Cormack-Lehane Classification: grade I - full view of glottis  Placement verified by: capnometry   Measured from: lips  ETT to lips (cm): 23  Number of attempts at approach: 1  Number of other approaches attempted: 0    Additional Comments  Easy BMV, easy intubation with elective Avila MAC 4 blade. Lips and teeth in preanesthetic condition.

## 2025-07-30 NOTE — OP NOTE
"LEFT KNEE ARTHROSCOPY WITH PARTIAL MEDIAL MENISCECTOMY (L) Operative Note     Date: 2025  OR Location: [facility]    Name: Blaine Méndez \"Nunu", : 1960, Age: 64 y.o., MRN: [id number], Sex: male    Diagnosis  Pre-op Diagnosis      * Arthritis of left knee [M17.12] Post-op Diagnosis     * Arthritis of left knee [M17.12]     Procedures    Left knee arthroscopy with partial medial meniscectomy    Surgeons      * Guille Lerner - Primary    Resident/Fellow/Other Assistant:    Cristóbal Coe    Staff:   Circulator: Sheryl  Scrub Person: José Miguel  Surgical Assistant: Cristóbal    Anesthesia Staff: Anesthesiologist: Adilson Alvarez MD  C-AA: EDILBERTO Bojorquez    Procedure Summary  Anesthesia: General  ASA: III  Estimated Blood Loss: 2 mL    Indications: 64-year-old with left knee pain and MRI demonstrating medial meniscus tear.  Treatment options including no treatment reviewed and the decision was made proceed with surgery.    Description of procedure: Patient was brought in the operating room and general anesthesia was performed.  Under sterile conditions the knee was injected with Marcaine and Astramorph.  He was positioned and prepped and draped in the usual fashion.  Anterolateral and anteromedial arthroscopy portals were used arthroscopic examination of the joint was performed.  There was grade II-III chondromalacia involving the median ridge of the patella.  Grade II chondromalacia on the trochlea.  No loose bodies in the medial and lateral gutters.  The medial compartment there was complex tearing involving the body and posterior horn of the medial meniscus.  There was grade III-IV chondromalacia over the posterior medial femoral condyle and grade III-IV chondromalacia over the posterior aspect of the medial tibial plateau.  In the notch the ACL and PCL were intact.  There was generalized grade II-III chondromalacia over the lateral tibial plateau.  Lateral meniscus was carefully probed and " visualized and was intact.  Grade I chondromalacia on the femoral condyle.  Attention was again turned to the medial compartment.  Using a combination of the motorized shaver and arthroscopic punches partial medial meniscectomy was performed until a smooth and stable edge was obtained.  Remaining meniscus was probed and was stable.  Chondroplasty was done along the articular surfaces as needed.  Additional local anesthetic was injected.  The instruments removed and Steri-Strips and a sterile dressing were applied and he tolerated the procedure well and was taken recovery in stable condition.    Intra-op Medications:   Administrations occurring from 1000 to 1125 on 07/30/25:   Medication Name Total Dose   BUPivacaine-EPINEPHrine (Marcaine w/EPI) 0.25 %-1:200,000 injection 20 mL   morphine PF (Duramorph) injection 5 mg   BUPivacaine HCl (Marcaine) 0.25 % (2.5 mg/mL) injection 40 mL   ceFAZolin (Ancef) 3 g in dextrose 5%  mL 3 g   dexAMETHasone (Decadron) 4 mg/mL IV Syringe 2 mL 4 mg   dexmedeTOMIDine (Precedex) 100 mcg/mL 2 mL single dose vial 28 mcg   fentaNYL (Sublimaze) injection 50 mcg/mL 150 mcg   LR infusion Cannot be calculated   lidocaine (cardiac) injection 2% prefilled syringe 100 mg   ondansetron (Zofran) 2 mg/mL injection 4 mg   phenylephrine 100 mcg/mL syringe 10 mL (prefilled) 300 mcg   propofol (Diprivan) injection 10 mg/mL 200 mg   rocuronium (ZeMuron) 50 mg/5 mL injection 70 mg   sugammadex (Bridion) 200 mg/2 mL injection 280 mg              Anesthesia Record               Intraprocedure I/O Totals          Intake    LR infusion 550.00 mL    ceFAZolin (Ancef) 3 g in dextrose 5%  mL 100.00 mL    Total Intake 650 mL       Output    Est. Blood Loss 10 mL    Total Output 10 mL       Net    Net Volume 640 mL          Specimen: No specimens collected       Task Performed by RNFA or Surgical Assistant:  The assistant was required due to the complexity of the procedure for positioning of the leg  and arthroscope.          Attending Attestation: I performed the procedure.    Guille Lerner  Phone Number: [phone]

## 2025-07-30 NOTE — ANESTHESIA PREPROCEDURE EVALUATION
"Patient: Blaine Méndez \"Trey\"    Procedure Information       Anesthesia Start Date/Time: 07/30/25 0956    Procedure: LEFT KNEE ARTHROSCOPY WITH PARTIAL MEDIAL MENISCECTOMY (Left: Knee)    Location: [facility] / [facility]    Surgeons: Guille Lerner MD            Relevant Problems   Cardiac   (+) HTN (hypertension)      Pulmonary   (+) Mild intermittent asthma with acute exacerbation (HHS-HCC)       Clinical information reviewed:    Allergies  Meds               NPO Detail:  NPO/Void Status  Carbohydrate Drink Given Prior to Surgery? : N  Date of Last Liquid: 07/30/25  Time of Last Liquid: 0630 (took 2 pills instructed by doctor)  Date of Last Solid: 07/29/25  Time of Last Solid: 2100  Last Intake Type: Solid meal  Time of Last Void: 0836         Physical Exam    Airway  Mallampati: II  TM distance: >3 FB  Neck ROM: full  Mouth opening: 3 or more finger widths     Cardiovascular   Rhythm: regular     Dental - normal exam     Pulmonary    Abdominal            Anesthesia Plan    History of general anesthesia?: yes  History of complications of general anesthesia?: no    ASA 3     general   (ETT)  The patient is not a current smoker.  Patient was not previously instructed to abstain from smoking on day of procedure.  Patient did not smoke on day of procedure.    intravenous induction   Anesthetic plan and risks discussed with patient.  Use of blood products discussed with patient who.    Plan discussed with [facility].      "

## 2025-07-30 NOTE — PROGRESS NOTES
"Physical Therapy    Physical Therapy Evaluation    Patient Name: Blaine Méndez \"Trey\"  MRN: [id number]  [facility]  Today's Date: 7/30/2025   Time Calculation  Start Time: 0920  Stop Time: 0942  Time Calculation (min): 22 min    Assessment/Plan   PT Assessment  Rehab Prognosis: Good  Barriers to Discharge Home: No anticipated barriers  Assessment Comment: Pt with no acute PT needs at this time. Crutch fitting and training completed pre-op. Will DC PT at this time.  End of Session Patient Position: Bed, 2 rail up, Alarm off, not on at start of session  IP OR SWING BED PT PLAN  Inpatient or Swing Bed: Inpatient  PT Plan  PT Plan: PT Eval only  PT Eval Only Reason: No acute PT needs identified  PT Frequency: PT eval only  PT Discharge Recommendations: No further acute PT (DC PT orders)  PT - OK to Discharge: Yes    Subjective     General Visit Information:  General  Reason for Referral: PT eval and treat; eval and crutch training. Pt scheduled for LLE medial meniscectomy today with Dr. Lerner  Referred By: Guille Lerner  Co-Treatment: OT  Co-Treatment Reason: For patient safety and to maximize mobility  Prior to Session Communication: Bedside nurse  Patient Position Received: Bed, 2 rail up (on cart in PACU)  General Comment: Pt resting in bed upon entering, agreeable to PT, Wife present    Home Living:  Home Living  Home Living Comments: Pt lives with wife in colonial house with 2 MARILYN with no rail. 1 flight of stairs with a landing up to bedroom and full bath. 1/2 bath first floor. Wife available to assist pt at home.    Prior Level of Function:  Prior Function Per Pt/Caregiver Report  Level of Agency: Independent with ADLs and functional transfers  ADL Assistance: Independent  Homemaking Assistance: Independent  Ambulatory Assistance: Independent  Vocational: Retired    Precautions:  Precautions  Precautions Comment: Per Dr. Lerner pt will be WBAT LLE after surgery. Crutches present in room    Vital " Signs:  Vital Signs  Vital Signs Comment: VSS throughout session  Objective     Pain:  Pain Assessment  Pain Assessment: 0-10  0-10 (Numeric) Pain Score: 0 - No pain    Cognition:  Cognition  Overall Cognitive Status: Within Functional Limits    General Assessments:         Sensation  Light Touch: No apparent deficits  Strength  Strength Comments: BLE WFL for strength and ROM  Perception  Inattention/Neglect: Appears intact  Coordination  Movements are Fluid and Coordinated: Yes             Functional Assessments:     Bed Mobility  Bed Mobility: Yes  Bed Mobility 1  Bed Mobility Comments 1: supine<>sit indendent  Transfers  Transfer: Yes  Transfer 1  Trials/Comments 1: sit to stand independent  Ambulation/Gait Training  Ambulation/Gait Training Performed:  (Pt is independent with ambuation without device. PT educates pt in bilateral axillary crutch training for WBAT post-op. Crutches fit appropriately. Pt demo's 15ft WBAT with crutches with no issues.)  Stairs  Stairs:  (No stairs available for pt training at this time. PT demonstrates and educates pt on proper stair training with crutches post-op. Wife and pt both verbal understanding and have no questions about stair negotiation with crutches.)      Outcome Measures:  [facility] Basic Mobility  Turning from your back to your side while in a flat bed without using bedrails: None  Moving from lying on your back to sitting on the side of a flat bed without using bedrails: None  Moving to and from bed to chair (including a wheelchair): None  Standing up from a chair using your arms (e.g. wheelchair or bedside chair): None  To walk in hospital room: None  Climbing 3-5 steps with railing: None  Basic Mobility - Total Score: 24    Goals:    Education Documentation  Mobility Training, taught by Annel Lei PT at 7/30/2025  1:48 PM.  Learner: Patient  Readiness: Acceptance  Method: Explanation  Response: Verbalizes Understanding  Comment: Crutch sizing and  training    Education Comments  No comments found.

## 2025-08-07 ENCOUNTER — OFFICE VISIT (OUTPATIENT)
Dept: ORTHOPEDIC SURGERY | Facility: CLINIC | Age: 65
End: 2025-08-07
Payer: COMMERCIAL

## 2025-08-07 VITALS — BODY MASS INDEX: 41.04 KG/M2 | HEIGHT: 72 IN | WEIGHT: 303 LBS

## 2025-08-07 DIAGNOSIS — S83.232D COMPLEX TEAR OF MEDIAL MENISCUS OF LEFT KNEE AS CURRENT INJURY, SUBSEQUENT ENCOUNTER: ICD-10-CM

## 2025-08-07 PROCEDURE — 99212 OFFICE O/P EST SF 10 MIN: CPT

## 2025-08-07 PROCEDURE — 99024 POSTOP FOLLOW-UP VISIT: CPT

## 2025-08-07 PROCEDURE — 1036F TOBACCO NON-USER: CPT

## 2025-08-07 PROCEDURE — 3008F BODY MASS INDEX DOCD: CPT

## 2025-08-07 NOTE — PROGRESS NOTES
Pleasant 64-year-old male presenting to the office status post left knee arthroscopy with partial medial meniscectomy that was performed by Dr. Lerner on July 30, 2025.  Making progress and having less pain.  He is using a cane to help with ambulation.  He has been taking meloxicam as needed as well as Tylenol.  He has an upcoming trip to [place] the first week of September.    The portals are healed without evidence of infection. There is a mild effusion. Range of motion is 0-110°.    The arthroscopic pictures were reviewed.  Discussion with patient in regards to grade 3-4 changes in the medial compartment of his knee, consistent with arthritis.  Precautions were reviewed. Physical therapy will be started,  he has an appointment scheduled for Monday.  Patient was advised if he remains symptomatic in 6 to 8 weeks, he can return to the office for cortisone injection.  I did advise that patient should make an appointment prior to his trip to [place] in case he needs this injection.

## 2025-08-11 ENCOUNTER — EVALUATION (OUTPATIENT)
Dept: PHYSICAL THERAPY | Facility: CLINIC | Age: 65
End: 2025-08-11
Payer: COMMERCIAL

## 2025-08-11 DIAGNOSIS — M25.661 DECREASED RANGE OF MOTION (ROM) OF RIGHT KNEE: ICD-10-CM

## 2025-08-11 DIAGNOSIS — R26.2 DIFFICULTY WALKING: Primary | ICD-10-CM

## 2025-08-11 PROCEDURE — 97110 THERAPEUTIC EXERCISES: CPT | Mod: GP | Performed by: PHYSICAL THERAPIST

## 2025-08-11 PROCEDURE — 97161 PT EVAL LOW COMPLEX 20 MIN: CPT | Mod: GP | Performed by: PHYSICAL THERAPIST

## 2025-08-13 ENCOUNTER — TREATMENT (OUTPATIENT)
Dept: PHYSICAL THERAPY | Facility: CLINIC | Age: 65
End: 2025-08-13
Payer: COMMERCIAL

## 2025-08-13 DIAGNOSIS — M25.661 DECREASED RANGE OF MOTION (ROM) OF RIGHT KNEE: ICD-10-CM

## 2025-08-13 DIAGNOSIS — R26.2 DIFFICULTY WALKING: ICD-10-CM

## 2025-08-13 PROCEDURE — 97110 THERAPEUTIC EXERCISES: CPT | Mod: GP | Performed by: PHYSICAL THERAPIST

## 2025-08-18 ENCOUNTER — TREATMENT (OUTPATIENT)
Dept: PHYSICAL THERAPY | Facility: CLINIC | Age: 65
End: 2025-08-18
Payer: COMMERCIAL

## 2025-08-18 DIAGNOSIS — R26.2 DIFFICULTY WALKING: ICD-10-CM

## 2025-08-18 DIAGNOSIS — M25.661 DECREASED RANGE OF MOTION (ROM) OF RIGHT KNEE: ICD-10-CM

## 2025-08-18 PROCEDURE — 97140 MANUAL THERAPY 1/> REGIONS: CPT | Mod: GP | Performed by: PHYSICAL THERAPIST

## 2025-08-18 PROCEDURE — 97110 THERAPEUTIC EXERCISES: CPT | Mod: GP | Performed by: PHYSICAL THERAPIST

## 2025-08-20 ENCOUNTER — TREATMENT (OUTPATIENT)
Dept: PHYSICAL THERAPY | Facility: CLINIC | Age: 65
End: 2025-08-20
Payer: COMMERCIAL

## 2025-08-20 DIAGNOSIS — M25.661 DECREASED RANGE OF MOTION (ROM) OF RIGHT KNEE: ICD-10-CM

## 2025-08-20 DIAGNOSIS — R26.2 DIFFICULTY WALKING: ICD-10-CM

## 2025-08-20 PROCEDURE — 97110 THERAPEUTIC EXERCISES: CPT | Mod: GP | Performed by: PHYSICAL THERAPIST

## 2025-08-20 PROCEDURE — 97112 NEUROMUSCULAR REEDUCATION: CPT | Mod: GP | Performed by: PHYSICAL THERAPIST

## 2025-08-25 ENCOUNTER — TREATMENT (OUTPATIENT)
Dept: PHYSICAL THERAPY | Facility: CLINIC | Age: 65
End: 2025-08-25
Payer: COMMERCIAL

## 2025-08-25 DIAGNOSIS — R26.2 DIFFICULTY WALKING: ICD-10-CM

## 2025-08-25 DIAGNOSIS — M25.661 DECREASED RANGE OF MOTION (ROM) OF RIGHT KNEE: ICD-10-CM

## 2025-08-25 PROCEDURE — 97110 THERAPEUTIC EXERCISES: CPT | Mod: GP | Performed by: PHYSICAL THERAPIST

## 2025-08-25 PROCEDURE — 97112 NEUROMUSCULAR REEDUCATION: CPT | Mod: GP | Performed by: PHYSICAL THERAPIST

## 2025-08-27 ENCOUNTER — TREATMENT (OUTPATIENT)
Dept: PHYSICAL THERAPY | Facility: CLINIC | Age: 65
End: 2025-08-27
Payer: COMMERCIAL

## 2025-08-27 DIAGNOSIS — M25.661 DECREASED RANGE OF MOTION (ROM) OF RIGHT KNEE: ICD-10-CM

## 2025-08-27 DIAGNOSIS — R26.2 DIFFICULTY WALKING: ICD-10-CM

## 2025-08-27 PROCEDURE — 97110 THERAPEUTIC EXERCISES: CPT | Mod: GP | Performed by: PHYSICAL THERAPIST

## 2025-08-27 PROCEDURE — 97112 NEUROMUSCULAR REEDUCATION: CPT | Mod: GP | Performed by: PHYSICAL THERAPIST

## 2025-08-29 ENCOUNTER — OFFICE VISIT (OUTPATIENT)
Dept: ORTHOPEDIC SURGERY | Facility: CLINIC | Age: 65
End: 2025-08-29
Payer: COMMERCIAL

## 2025-08-29 DIAGNOSIS — S83.232D COMPLEX TEAR OF MEDIAL MENISCUS OF LEFT KNEE AS CURRENT INJURY, SUBSEQUENT ENCOUNTER: Primary | ICD-10-CM

## 2025-08-29 DIAGNOSIS — M17.12 ARTHRITIS OF LEFT KNEE: ICD-10-CM

## 2025-08-29 DIAGNOSIS — G89.29 CHRONIC PAIN OF LEFT KNEE: ICD-10-CM

## 2025-08-29 DIAGNOSIS — M25.562 CHRONIC PAIN OF LEFT KNEE: ICD-10-CM

## 2025-08-29 PROCEDURE — 20610 DRAIN/INJ JOINT/BURSA W/O US: CPT | Mod: LT

## 2025-08-29 PROCEDURE — 99212 OFFICE O/P EST SF 10 MIN: CPT

## 2025-08-29 PROCEDURE — 2500000004 HC RX 250 GENERAL PHARMACY W/ HCPCS (ALT 636 FOR OP/ED)

## 2025-08-29 RX ORDER — LIDOCAINE HYDROCHLORIDE 20 MG/ML
2 INJECTION, SOLUTION INFILTRATION; PERINEURAL
Status: COMPLETED | OUTPATIENT
Start: 2025-08-29 | End: 2025-08-29

## 2025-08-29 RX ORDER — TRIAMCINOLONE ACETONIDE 40 MG/ML
40 INJECTION, SUSPENSION INTRA-ARTICULAR; INTRAMUSCULAR
Status: COMPLETED | OUTPATIENT
Start: 2025-08-29 | End: 2025-08-29

## 2025-08-29 RX ADMIN — LIDOCAINE HYDROCHLORIDE 2 ML: 20 INJECTION, SOLUTION INFILTRATION; PERINEURAL at 11:40

## 2025-08-29 RX ADMIN — TRIAMCINOLONE ACETONIDE 40 MG: 400 INJECTION, SUSPENSION INTRA-ARTICULAR; INTRAMUSCULAR at 11:40

## 2025-08-29 ASSESSMENT — ENCOUNTER SYMPTOMS: KNEE DEFORMITY: 1

## 2025-09-25 ENCOUNTER — APPOINTMENT (OUTPATIENT)
Dept: UROLOGY | Facility: CLINIC | Age: 65
End: 2025-09-25
Payer: COMMERCIAL

## 2025-09-30 ENCOUNTER — APPOINTMENT (OUTPATIENT)
Dept: DERMATOLOGY | Facility: CLINIC | Age: 65
End: 2025-09-30
Payer: COMMERCIAL

## (undated) DEVICE — PADDING, CAST, WYTEX, 6 IN X 4 YD, LF

## (undated) DEVICE — NEEDLE, HYPODERMIC, SAFETYGLIDE, SHIELDING, REGULAR WALL, REGULAR BEVEL, 22 G X 1.5 IN, BLACK HUB

## (undated) DEVICE — APPLICATOR, CHLORAPREP, W/ORANGE TINT, 26ML

## (undated) DEVICE — DRESSING, ABDOMINAL, TENDERSORB, 8 X 7-1/2 IN, STERILE

## (undated) DEVICE — SHAVER, BONE CUTTER, 4.0MM

## (undated) DEVICE — Device

## (undated) DEVICE — SLEEVE, VASO PRESS, CALF GARMENT, MEDIUM, GREEN

## (undated) DEVICE — TUBING, PATIENT 8FT STERILE

## (undated) DEVICE — TUBING, PUMP REDEUCE 8FT STERILE

## (undated) DEVICE — SYRINGE, 50 CC, LUER LOCK

## (undated) DEVICE — DRESSING, GAUZE, PETROLATUM, PATCH, XEROFORM, 1 X 8 IN, STERILE

## (undated) DEVICE — WOUND SYSTEM, DEBRIDEMENT & CLEANING, O.R DUOPAK

## (undated) DEVICE — DRESSING, GAUZE, SPONGE, 12 PLY, CURITY, 4 X 4 IN, RIGID TRAY, STERILE

## (undated) DEVICE — BANDAGE, ELASTIC, PREMIUM, SELF-CLOSE, 6 IN X 5.5 YD, STERILE

## (undated) DEVICE — STRIP, SKIN CLOSURE, STERI STRIP, REINFORCED, 0.5 X 4 IN

## (undated) DEVICE — NEEDLE, HYPODERMIC, SAFETY, GLIDE, 18G X 1.5"